# Patient Record
Sex: FEMALE | Race: WHITE | NOT HISPANIC OR LATINO | Employment: PART TIME | URBAN - METROPOLITAN AREA
[De-identification: names, ages, dates, MRNs, and addresses within clinical notes are randomized per-mention and may not be internally consistent; named-entity substitution may affect disease eponyms.]

---

## 2018-02-06 ENCOUNTER — OFFICE VISIT (OUTPATIENT)
Dept: MEDICAL GROUP | Facility: MEDICAL CENTER | Age: 73
End: 2018-02-06
Payer: MEDICARE

## 2018-02-06 VITALS
SYSTOLIC BLOOD PRESSURE: 124 MMHG | DIASTOLIC BLOOD PRESSURE: 70 MMHG | BODY MASS INDEX: 35.17 KG/M2 | RESPIRATION RATE: 16 BRPM | HEIGHT: 62 IN | WEIGHT: 191.14 LBS | HEART RATE: 84 BPM | TEMPERATURE: 98.1 F | OXYGEN SATURATION: 96 %

## 2018-02-06 DIAGNOSIS — E66.9 OBESITY (BMI 30-39.9): ICD-10-CM

## 2018-02-06 DIAGNOSIS — Z76.89 ENCOUNTER TO ESTABLISH CARE: ICD-10-CM

## 2018-02-06 DIAGNOSIS — E10.319 TYPE 1 DIABETES MELLITUS WITH RETINOPATHY, MACULAR EDEMA PRESENCE UNSPECIFIED, UNSPECIFIED LATERALITY, UNSPECIFIED RETINOPATHY SEVERITY (HCC): ICD-10-CM

## 2018-02-06 DIAGNOSIS — E03.9 ACQUIRED HYPOTHYROIDISM: ICD-10-CM

## 2018-02-06 PROBLEM — E10.39 TYPE 1 DIABETES MELLITUS WITH OPHTHALMIC COMPLICATION (HCC): Status: ACTIVE | Noted: 2018-02-06

## 2018-02-06 LAB
HBA1C MFR BLD: 8.9 % (ref ?–5.8)
INT CON NEG: NORMAL
INT CON POS: NORMAL

## 2018-02-06 PROCEDURE — 83036 HEMOGLOBIN GLYCOSYLATED A1C: CPT | Performed by: PHYSICIAN ASSISTANT

## 2018-02-06 PROCEDURE — 99204 OFFICE O/P NEW MOD 45 MIN: CPT | Performed by: PHYSICIAN ASSISTANT

## 2018-02-06 RX ORDER — LISINOPRIL 20 MG/1
20 TABLET ORAL DAILY
COMMUNITY
End: 2018-02-06

## 2018-02-06 RX ORDER — INSULIN GLARGINE 100 [IU]/ML
INJECTION, SOLUTION SUBCUTANEOUS NIGHTLY
COMMUNITY
End: 2018-12-31 | Stop reason: SDUPTHER

## 2018-02-06 RX ORDER — CHOLESTYRAMINE LIGHT 4 G/5.7G
POWDER, FOR SUSPENSION ORAL
COMMUNITY

## 2018-02-06 RX ORDER — LEVOTHYROXINE SODIUM 112 UG/1
112 TABLET ORAL
COMMUNITY
End: 2018-06-05 | Stop reason: SDUPTHER

## 2018-02-06 RX ORDER — INDAPAMIDE 2.5 MG/1
2.5 TABLET ORAL EVERY MORNING
COMMUNITY
End: 2018-02-06

## 2018-02-06 RX ORDER — LISINOPRIL 20 MG/1
20 TABLET ORAL DAILY
Qty: 30 TAB | Refills: 0
Start: 2018-02-06 | End: 2019-02-05 | Stop reason: SDUPTHER

## 2018-02-06 RX ORDER — LANCETS 30 GAUGE
EACH MISCELLANEOUS
COMMUNITY
End: 2018-05-11

## 2018-02-06 RX ORDER — ATORVASTATIN CALCIUM 20 MG/1
20 TABLET, FILM COATED ORAL NIGHTLY
COMMUNITY
End: 2018-05-04 | Stop reason: SDUPTHER

## 2018-02-06 RX ORDER — ERGOCALCIFEROL 1.25 MG/1
CAPSULE ORAL
COMMUNITY
End: 2018-12-31 | Stop reason: SDUPTHER

## 2018-02-06 ASSESSMENT — PATIENT HEALTH QUESTIONNAIRE - PHQ9
CLINICAL INTERPRETATION OF PHQ2 SCORE: 1
SUM OF ALL RESPONSES TO PHQ QUESTIONS 1-9: 7
5. POOR APPETITE OR OVEREATING: 0 - NOT AT ALL

## 2018-02-06 NOTE — ASSESSMENT & PLAN NOTE
This is a 72-year-old female who is here today to establish care. She also is here to discuss her type 1 diabetes. Recently moved from [place]. She believes that over the past month or 2 her diabetes has been coming out of control. She is currently on Humalog and Lantus. Wants to be referred to endocrinology. Does have complications of retinopathy. Is seen by a retinal specialist. Would like to see somebody close to her home on [facility].

## 2018-02-06 NOTE — PROGRESS NOTES
"Subjective:   Geraldine Scott is a 72 y.o. female here today for uncontrolled diabetes and to establish care.    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 She also is here to discuss her type 1 diabetes. Recently moved from [place]. She believes that over the past month or 2 her diabetes has been coming out of control. She is currently on Humalog and Lantus. Wants to be referred to endocrinology. Does have complications of retinopathy. Is seen by a retinal specialist. Would like to see somebody close to her home on [facility].    Acquired hypothyroidism  Years ago she was diagnosed with hypothyroidism. For a long time has been taking levothyroxine 112 µg daily.       Current medicines (including changes today)  Reviewed and updated accordingly.    She  has no past medical history on file.    Social History and Family History were reviewed and updated.    ROS   No chest pain, no shortness of breath, no abdominal pain and all other systems were reviewed and are negative.       Objective:     Blood pressure 124/70, pulse 84, temperature 36.7 °C (98.1 °F), resp. rate 16, height 1.585 m (5' 2.4\"), weight 86.7 kg (191 lb 2.2 oz), SpO2 96 %. Body mass index is 34.51 kg/m².   Physical Exam:  Constitutional: Alert, no distress.  Skin: Warm, dry, good turgor, no rashes in visible areas.  Eye: Equal, round and reactive, conjunctiva clear, lids normal.  ENMT: Lips without lesions, good dentition, oropharynx clear.  Neck: Trachea midline, no masses.   Lymph: No cervical or supraclavicular lymphadenopathy  Respiratory: Unlabored respiratory effort, lungs appear clear, no wheezes.  Cardiovascular: Normal S1, S2, no murmur, no edema.  Psych: Alert and oriented x3, normal affect and mood.    A1c at 8.9.    Assessment and Plan:   The following treatment plan was discussed    1. Type 1 diabetes mellitus with retinopathy, macular edema presence " unspecified, unspecified laterality, unspecified retinopathy severity (CMS-HCC)  Chronic condition. Uncontrolled diabetes currently. Advised to increase Lantus use 2 units to obtain a better fasting glucose such as 140. Refer to endocrinology. Contact me if she needs any medication renewals.  - REFERRAL TO ENDOCRINOLOGY  - POCT  A1C  - lisinopril (PRINIVIL) 20 MG Tab; Take 1 Tab by mouth every day.  Dispense: 30 Tab; Refill: 0    2. Obesity (BMI 30-39.9)  Chronic condition. Urged exercise routinely. Heat healthier.  - Patient identified as having weight management issue.  Appropriate orders and counseling given.    3. Acquired hypothyroidism  Chronic condition. Status unknown. Referred to endocrinology.    4. Encounter to establish care      Followup: Return if symptoms worsen or fail to improve.    Please note that this dictation was created using voice recognition software. I have made every reasonable attempt to correct obvious errors, but I expect that there are errors of grammar and possibly content that I did not discover before finalizing the note.

## 2018-02-06 NOTE — PROGRESS NOTES
"Subjective:   Geraldine Scott is a 72 y.o. female here today for ***    No problem-specific Assessment & Plan notes found for this encounter.     ***    Current medicines (including changes today)  Current Outpatient Prescriptions   Medication Sig Dispense Refill   • glucose blood strip 1 Each by Other route as needed.     • Lancets Misc by Does not apply route. Lancets order: Lancets for {AMB DIABETES SUPPLIES:6081880} meter. Sig: use *** and prn ssx high or low sugar. #100 RF x 3     • insulin glargine (LANTUS) 100 UNIT/ML Solution Inject  as instructed every evening.     • levothyroxine (SYNTHROID) 112 MCG Tab Take 112 mcg by mouth Every morning on an empty stomach.     • Insulin Pen Needle 32G X 5 MM Misc by Does not apply route.     • cholestyramine light (PREVALITE) 4 GM/DOSE powder Take  by mouth.     • vitamin D, Ergocalciferol, (DRISDOL) 21304 units Cap capsule Take  by mouth every 7 days.     • atorvastatin (LIPITOR) 20 MG Tab Take 20 mg by mouth every evening.     • Insulin Lispro 100 UNIT/ML Solution Cartridge Inject  as instructed.       No current facility-administered medications for this visit.      She  has no past medical history on file.    Social History and Family History were reviewed and updated.    ROS ***  No chest pain, no shortness of breath, no abdominal pain and all other systems were reviewed and are negative.       Objective:     Blood pressure 124/70, pulse 84, temperature 36.7 °C (98.1 °F), resp. rate 16, height 1.585 m (5' 2.4\"), weight 86.7 kg (191 lb 2.2 oz), SpO2 96 %. Body mass index is 34.51 kg/m². ***  Physical Exam:  Constitutional: Alert, no distress.  Skin: Warm, dry, good turgor, no rashes in visible areas.  Eye: Equal, round and reactive, conjunctiva clear, lids normal.  ENMT: Lips without lesions, good dentition, oropharynx clear.  Neck: Trachea midline, no masses.   Lymph: No cervical or supraclavicular lymphadenopathy  Respiratory: Unlabored respiratory effort, lungs " clear to auscultation, no wheezes, no ronchi.  Cardiovascular: Normal S1, S2, no murmur, no edema.  Abdomen: Soft, non-tender, no masses.  Psych: Alert and oriented x3, normal affect and mood.        Assessment and Plan:   The following treatment plan was discussed    1. Type 1 diabetes mellitus with retinopathy, macular edema presence unspecified, unspecified laterality, unspecified retinopathy severity (CMS-HCC)  ***  - REFERRAL TO ENDOCRINOLOGY  - POCT  A1C    2. Obesity (BMI 30-39.9)  ***  - Patient identified as having weight management issue.  Appropriate orders and counseling given.    3. Acquired hypothyroidism  ***      Followup: No Follow-up on file.    Please note that this dictation was created using voice recognition software. I have made every reasonable attempt to correct obvious errors, but I expect that there are errors of grammar and possibly content that I did not discover before finalizing the note.

## 2018-02-06 NOTE — ASSESSMENT & PLAN NOTE
Years ago she was diagnosed with hypothyroidism. For a long time has been taking levothyroxine 112 µg daily.

## 2018-03-15 ENCOUNTER — APPOINTMENT (OUTPATIENT)
Dept: OTHER | Facility: IMAGING CENTER | Age: 73
End: 2018-03-15

## 2018-04-30 ENCOUNTER — OFFICE VISIT (OUTPATIENT)
Dept: ENDOCRINOLOGY | Facility: MEDICAL CENTER | Age: 73
End: 2018-04-30
Payer: MEDICARE

## 2018-04-30 VITALS
WEIGHT: 193 LBS | HEART RATE: 87 BPM | OXYGEN SATURATION: 97 % | HEIGHT: 62 IN | DIASTOLIC BLOOD PRESSURE: 70 MMHG | SYSTOLIC BLOOD PRESSURE: 140 MMHG | BODY MASS INDEX: 35.51 KG/M2

## 2018-04-30 DIAGNOSIS — E78.2 MIXED HYPERLIPIDEMIA: ICD-10-CM

## 2018-04-30 DIAGNOSIS — I10 ESSENTIAL HYPERTENSION: ICD-10-CM

## 2018-04-30 DIAGNOSIS — E10.65 TYPE 1 DIABETES MELLITUS WITH HYPERGLYCEMIA (HCC): ICD-10-CM

## 2018-04-30 DIAGNOSIS — E03.9 ACQUIRED HYPOTHYROIDISM: ICD-10-CM

## 2018-04-30 PROCEDURE — 99204 OFFICE O/P NEW MOD 45 MIN: CPT | Performed by: INTERNAL MEDICINE

## 2018-04-30 NOTE — PROGRESS NOTES
New Patient Consult Note  Referred by: Clay Maguire P.A.-C.    Reason for consult: Diabetes management    HPI:  Geraldine Scott is a 72 y.o. old patient who comes in today to establish care for diabetes. She was diagnosed with type 1 diabetes in 1994. Initially she was diagnosed with type 2 diabetes and a few years later she went into diabetic ketoacidosis and then diagnosed with type 1 diabetes. She has been on insulin ever since. She is currently taking Lantus 15 units every morning and 16 units at bedtime. She takes Humalog one unit per 6 g of carbs plus correction factor of one additional unit for every 25 mg/dL blood glucose above 150. She reports compliance with medications. She checks blood sugars 4-5 times a day. She has continuous glucose monitoring system as well. Fasting blood sugar in the morning has mostly been above 140. Pre-meal blood sugar readings are well controlled. Bedtime blood sugar readings are higher than goal.    ROS:  Constitutional: No weight loss  Cardiac: No palpitations or racing heart  Resp: No shortness of breath  All other systems were reviewed and were negative.    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     Social History Main Topics   • Smoking status: Never Smoker   • Smokeless tobacco: Never Used   • Alcohol use No   • Drug use: No   • Sexual activity: Not Currently     Other Topics Concern   • Not on file     Social  "History Narrative   • No narrative on file       Family History:  History reviewed. No pertinent family history.    Physical Examination:  Vital signs: /70   Pulse 87   Ht 1.575 m (5' 2\")   Wt 87.5 kg (193 lb)   SpO2 97%   BMI 35.30 kg/m²   General: No apparent distress, cooperative  Eyes: No scleral icterus or discharge  ENMT: Normal on external inspection of nose, lips  Neck: No abnormal masses on inspection  Resp: Normal effort, clear to auscultation bilaterally   CVS: Regular rate and rhythm, S1 S2 normal, no murmur   Extremities: No edema  Neuro: Alert and oriented  Skin: No rash  Psych: Normal mood and affect  Foot exam: normal sensation to monofilament testing, normal pulses, no ulcers    Assessment and Plan:    1. Type 1 diabetes mellitus with hyperglycemia (HCC)  · Hemoglobin A1c in February was 8.9%  · Goal A1c less than 7.5%  · Increased Lantus to 16 units twice a day  · Changed Humalog to one unit per 6 g of carbs for breakfast and lunch, and one unit for 5 g of carbs for dinner, plus correction factor of one additional unit for every 25 mg/dL blood glucose above 150.  · Repeat labs:  - BASIC METABOLIC PANEL; Future  - MICROALBUMIN CREAT RATIO URINE; Future  - Blood Glucose Monitoring Suppl Supplies Misc; reBounces contour glucometer test strips for BG check 4-5 times a day  Dispense: 150 Each; Refill: 6    2. Acquired hypothyroidism  · Continue levothyroxine 112 µg daily  · Repeat labs  - TSH; Future  - FREE THYROXINE; Future    3. Essential hypertension  · Blood pressure is slightly higher than goal in clinic today, however she reports that home blood pressure readings are well controlled  · Continue lisinopril    4. Mixed hyperlipidemia  · Continue Lipitor  · Repeat labs  - LIPID PROFILE; Future    Return in about 3 months (around 7/30/2018).    Thank you for allowing me to participate in the care of this patient.    Destini Rae M.D.  04/30/18    CC:   Clay Maguire P.A.-C.    This note " was created using voice recognition software (Dragon). The accuracy of the dictation is limited by the abilities of the software. I have reviewed the note prior to signing, however some errors in grammar and context are still possible. If you have any questions related to this note please do not hesitate to contact our office.

## 2018-04-30 NOTE — PATIENT INSTRUCTIONS
INSULIN DOSING    Long acting insulin: Lantus  16 units at twice a day (12 hrs apart)   Short acting insulin: Humalog 1 unit for 6 gram carbs for b'fast and lunch, and 1 unit for 5 gram of carbs for dinner     Adjusting long acting insulin dose: Check fasting blood sugar every morning. If fasting blood sugar in the morning is greater than 140 for 3 days in a row then increase the dose of your long-acting insulin by 1 unit(s). In case you have any low blood sugar (less than 70) in the middle of the night or in the morning before breakfast, then lower the dose of your long-acting insulin by 2 units. Goal blood sugar in the morning is in the range of .    Adjusting short-acting insulin dose: Check your blood sugar just before eating, if pre- meal blood sugar reading is less than 140 then take the scheduled dose of short-acting insulin as mentioned above. However if your blood sugar before a meal is greater than 140 then add additional short-acting insulin as per scale below:    Blood sugar reading: Dose of additional short acting insulin:   151-175 1 unit   176-200 2 units   201-225 3 units   226-250 4 units   251-275 5 units   276-300 6 units   Over 300 7 units     Goal blood sugar 2 hours after meals: Under 180    Frequency of blood sugar checks: Fasting in the morning, and just before each meal.    Hypoglycemia symptom recognition:   Insulin can cause low blood sugar, symptoms include:   · Shakiness  · Weakness  · Sweating  · Hunger  · Confusion  · Nightmares or nighttime sweats if the low blood sugar happens while you are asleep    Low blood sugar symptoms typically start when your blood sugar is less than 70 mg/dl. If untreated, extreme low blood sugar can lead to:  · Unconsciousness   · Seizures    Rarely, some people with diabetes slip into hypoglycemia without these typical symptoms. This is called hypoglycemia unawareness. Most episodes of low blood sugar can be prevented by good planning. Skipping  meals, taking too much insulin or exercising can cause hypoglycemia.     Hypoglycemia treatment:   Treat low blood sugars as soon as possible. Always have a source of carbohydrate with you. Here are some handy foods to treat hypoglycemia quickly:  · Three to five glucose tablets (usually contain about 3 grams of glucose each)  · 4 ounces of fruit juice or 8 ounces of milk  · Cake frosting in a tube, about a tablespoon    Hypoglycemia can also be treated with an injected medication called glucagon. Glucagon comes in a small, red plastic box. The glucagon is a powder in a sterile vial that needs to be mixed with a liquid before injection. The glucagon kit can be kept in the refrigerator for up to 2 years. It is a good idea to teach someone who lives with you on how to mix and inject glucagon. The glucagon kit comes with very clear directions.     Glucagon causes some brief nausea. Glucagon works for just 10-15 minutes; so it is important to eat or drink some sugar after glucagon is given.     The Rule of 15:  The Rule of 15 is a simple tool that is easy to remember. When you think you are having a hypoglycemic reaction:   · Take your blood sugar (if you can't do this safely, just treat the symptoms!)  · If it is less than 70 mg/dl, eat 15 grams of carbohydrate  · Wait for 15 minutes  · If your blood sugar is still less than 70, take another 15 grams of carbohydrate and re-check your blood sugar in 15 minutes

## 2018-05-04 RX ORDER — ATORVASTATIN CALCIUM 20 MG/1
20 TABLET, FILM COATED ORAL
Qty: 30 TAB | Refills: 6 | Status: SHIPPED | OUTPATIENT
Start: 2018-05-04 | End: 2018-10-31 | Stop reason: SDUPTHER

## 2018-05-05 ENCOUNTER — HOSPITAL ENCOUNTER (OUTPATIENT)
Dept: LAB | Facility: MEDICAL CENTER | Age: 73
End: 2018-05-05
Attending: INTERNAL MEDICINE
Payer: MEDICARE

## 2018-05-05 DIAGNOSIS — E78.2 MIXED HYPERLIPIDEMIA: ICD-10-CM

## 2018-05-05 DIAGNOSIS — E10.65 TYPE 1 DIABETES MELLITUS WITH HYPERGLYCEMIA (HCC): ICD-10-CM

## 2018-05-05 DIAGNOSIS — E03.9 ACQUIRED HYPOTHYROIDISM: ICD-10-CM

## 2018-05-05 LAB
ANION GAP SERPL CALC-SCNC: 8 MMOL/L (ref 0–11.9)
BUN SERPL-MCNC: 19 MG/DL (ref 8–22)
CALCIUM SERPL-MCNC: 9.1 MG/DL (ref 8.5–10.5)
CHLORIDE SERPL-SCNC: 106 MMOL/L (ref 96–112)
CHOLEST SERPL-MCNC: 154 MG/DL (ref 100–199)
CO2 SERPL-SCNC: 26 MMOL/L (ref 20–33)
CREAT SERPL-MCNC: 0.63 MG/DL (ref 0.5–1.4)
CREAT UR-MCNC: 93.3 MG/DL
GLUCOSE SERPL-MCNC: 195 MG/DL (ref 65–99)
HDLC SERPL-MCNC: 62 MG/DL
LDLC SERPL CALC-MCNC: 71 MG/DL
MICROALBUMIN UR-MCNC: 3.9 MG/DL
MICROALBUMIN/CREAT UR: 42 MG/G (ref 0–30)
POTASSIUM SERPL-SCNC: 4.8 MMOL/L (ref 3.6–5.5)
SODIUM SERPL-SCNC: 140 MMOL/L (ref 135–145)
T4 FREE SERPL-MCNC: 1.21 NG/DL (ref 0.53–1.43)
TRIGL SERPL-MCNC: 103 MG/DL (ref 0–149)
TSH SERPL DL<=0.005 MIU/L-ACNC: 1.24 UIU/ML (ref 0.38–5.33)

## 2018-05-05 PROCEDURE — 80048 BASIC METABOLIC PNL TOTAL CA: CPT

## 2018-05-05 PROCEDURE — 82570 ASSAY OF URINE CREATININE: CPT

## 2018-05-05 PROCEDURE — 36415 COLL VENOUS BLD VENIPUNCTURE: CPT

## 2018-05-05 PROCEDURE — 82043 UR ALBUMIN QUANTITATIVE: CPT

## 2018-05-05 PROCEDURE — 80061 LIPID PANEL: CPT

## 2018-05-05 PROCEDURE — 84439 ASSAY OF FREE THYROXINE: CPT

## 2018-05-05 PROCEDURE — 84443 ASSAY THYROID STIM HORMONE: CPT

## 2018-05-11 ENCOUNTER — OFFICE VISIT (OUTPATIENT)
Dept: MEDICAL GROUP | Facility: MEDICAL CENTER | Age: 73
End: 2018-05-11
Payer: MEDICARE

## 2018-05-11 VITALS
OXYGEN SATURATION: 98 % | SYSTOLIC BLOOD PRESSURE: 108 MMHG | TEMPERATURE: 98 F | HEART RATE: 83 BPM | BODY MASS INDEX: 36.35 KG/M2 | WEIGHT: 197.53 LBS | HEIGHT: 62 IN | DIASTOLIC BLOOD PRESSURE: 60 MMHG

## 2018-05-11 DIAGNOSIS — Z78.9 PATIENT HAS ACTIVE PHYSICIAN ORDERS FOR LIFE-SUSTAINING TREATMENT (POLST) FORM: ICD-10-CM

## 2018-05-11 DIAGNOSIS — E10.319 TYPE 1 DIABETES MELLITUS WITH RETINOPATHY, MACULAR EDEMA PRESENCE UNSPECIFIED, UNSPECIFIED LATERALITY, UNSPECIFIED RETINOPATHY SEVERITY (HCC): ICD-10-CM

## 2018-05-11 DIAGNOSIS — E66.9 OBESITY (BMI 30-39.9): ICD-10-CM

## 2018-05-11 DIAGNOSIS — Z12.31 SCREENING MAMMOGRAM, ENCOUNTER FOR: ICD-10-CM

## 2018-05-11 DIAGNOSIS — E03.9 ACQUIRED HYPOTHYROIDISM: ICD-10-CM

## 2018-05-11 PROCEDURE — 99214 OFFICE O/P EST MOD 30 MIN: CPT | Performed by: PHYSICIAN ASSISTANT

## 2018-05-11 NOTE — ASSESSMENT & PLAN NOTE
Is not losing weight. Is gaining weight. Today she drank 4 cups of coffee. She knows what she needs to do but just doesn't do it.

## 2018-05-11 NOTE — PROGRESS NOTES
Subjective:   Geraldine Scott is a 72 y.o. female here today for type 1 diabetes and hypothyroidism.    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 She saw endocrinology. Insulin was changed to 16 units twice a day. This morning her glucose levels were high. Last night she had a poor diet. Recent fasting glucose level was in the 190s. Last A1c was 8.9. She is trying to make some conscientious dietary decisions. She doesn't exercise.    Acquired hypothyroidism  She also has chronic hypothyroidism. Is on Synthroid 112 µg. Last TSH value was within normal limits. She did see endocrinology. Everything is stable with her thyroid.    Obesity (BMI 30-39.9)  Is not losing weight. Is gaining weight. Today she drank 4 cups of coffee. She knows what she needs to do but just doesn't do it.    Patient has active physician orders for life-sustaining treatment (POLST) form  Lastly she wanted to discuss completing a POLST form. She has no interest in being resuscitated.        Current medicines (including changes today)  Current Outpatient Prescriptions   Medication Sig Dispense Refill   • atorvastatin (LIPITOR) 20 MG Tab Take 1 Tab by mouth every bedtime. 30 Tab 6   • Blood Glucose Monitoring Suppl Supplies Misc Aiden contour glucometer test strips for BG check 4-5 times a day 150 Each 6   • glucose blood strip 1 Each by Other route as needed.     • insulin glargine (LANTUS) 100 UNIT/ML Solution Inject  as instructed every evening.     • levothyroxine (SYNTHROID) 112 MCG Tab Take 112 mcg by mouth Every morning on an empty stomach.     • cholestyramine light (PREVALITE) 4 GM/DOSE powder Take  by mouth.     • vitamin D, Ergocalciferol, (DRISDOL) 92468 units Cap capsule Take  by mouth every 7 days.     • Insulin Lispro 100 UNIT/ML Solution Cartridge Inject  as instructed.     • lisinopril (PRINIVIL) 20 MG Tab Take 1 Tab by mouth every  "day. 30 Tab 0   • Lancets Misc by Does not apply route. Lancets order: Lancets for {AMB DIABETES SUPPLIES:1558081} meter. Sig: use *** and prn ssx high or low sugar. #100 RF x 3     • Insulin Pen Needle 32G X 5 MM Misc by Does not apply route.       No current facility-administered medications for this visit.      She  has no past medical history on file.    Social History and Family History were reviewed and updated.    ROS   No chest pain, no shortness of breath, no abdominal pain and all other systems were reviewed and are negative.       Objective:     Blood pressure 108/60, pulse 83, temperature 36.7 °C (98 °F), height 1.575 m (5' 2\"), weight 89.6 kg (197 lb 8.5 oz), SpO2 98 %. Body mass index is 36.13 kg/m².   Physical Exam:  Constitutional: Alert, no distress.  Skin: Warm, dry, good turgor, no rashes in visible areas.  Eye: Equal, round and reactive, conjunctiva clear, lids normal.  ENMT: Lips without lesions, good dentition, oropharynx clear.  Neck: Trachea midline, no masses.   Lymph: No cervical or supraclavicular lymphadenopathy  Respiratory: Unlabored respiratory effort, lungs clear to auscultation, no wheezes, no ronchi.  Cardiovascular: Normal S1, S2, no murmur, no edema.  Psych: Alert and oriented x3, normal affect and mood.        Assessment and Plan:   The following treatment plan was discussed    1. Type 1 diabetes mellitus with retinopathy, macular edema presence unspecified, unspecified laterality, unspecified retinopathy severity (HCC)  Chronic condition. Uncontrolled. Continue Lantus 16 units twice a day. Advised to eat healthier. Refer to dietitian. Counter carbs accurately and then dose insulin appropriately.  - REFERRAL TO [facility] HEALTH IMPROVEMENT PROGRAMS (HIP) Services Requested: Registered Dietitian Medicare Obesity Counseling, Registered Dietitian for Medical Nutrition Therapy; Reason for Visit: Overweight/Obesity, Medical Condition Requiring Nutrit...    2. Acquired " hypothyroidism  Tonic condition. Stable. Continue levothyroxine 112 µg.    3. Obesity (BMI 30-39.9)  Chronic condition. Worsening. Refer to dietitian.    4. Screening mammogram, encounter for  Ordered mammogram screening.  - MA-SCREEN MAMMO W/CAD-BILAT; Future    5. Patient has active physician orders for life-sustaining treatment (POLST) form  Patient brought this upon the meeting at the very end. Advised her to follow-up with Dr. Lincoln to complete the form. He was disappointed but agreed.      Followup: Return if symptoms worsen or fail to improve.    Please note that this dictation was created using voice recognition software. I have made every reasonable attempt to correct obvious errors, but I expect that there are errors of grammar and possibly content that I did not discover before finalizing the note.

## 2018-05-11 NOTE — ASSESSMENT & PLAN NOTE
This is a 72-year-old female follows up to discuss her type 1 diabetes. She saw endocrinology. Insulin was changed to 16 units twice a day. This morning her glucose levels were high. Last night she had a poor diet. Recent fasting glucose level was in the 190s. Last A1c was 8.9. She is trying to make some conscientious dietary decisions. She doesn't exercise.

## 2018-05-11 NOTE — PROGRESS NOTES
"Subjective:   Geraldine Scott is a 72 y.o. female here today for type 1 diabetes and hypothyroidism.    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 She saw endocrinology. Insulin was changed to 16 units twice a day. This morning her glucose levels were high. Last night she had a poor diet. Recent fasting glucose level was in the 190s. Last A1c was 8.9. She is trying to make some conscientious dietary decisions. She doesn't exercise.    Acquired hypothyroidism  She also has chronic hypothyroidism. Is on Synthroid 112 µg. Last TSH value was within normal limits. She did see endocrinology. Everything is stable with her thyroid.    Obesity (BMI 30-39.9)  Is not losing weight. Is gaining weight. Today she drank 4 cups of coffee. She knows what she needs to do but just doesn't do it.    Patient has active physician orders for life-sustaining treatment (POLST) form  Lastly she wanted to discuss completing a POLST form. She has no interest in being resuscitated.        Current medicines (including changes today)  Medications include Lantus 16 units twice a day, Synthroid 112 µg daily, lisinopril 20 mg daily and Lipitor 20 mg daily. Also vitamin D 50,000 units weekly.    She  has no past medical history on file.      Social History and Family History were reviewed and updated.    ROS   No chest pain, no shortness of breath, no abdominal pain and all other systems were reviewed and are negative.       Objective:     Blood pressure 108/60, pulse 83, temperature 36.7 °C (98 °F), height 1.575 m (5' 2\"), weight 89.6 kg (197 lb 8.5 oz), SpO2 98 %. Body mass index is 36.13 kg/m².   Physical Exam:  Constitutional: Alert, no distress.  Skin: Warm, dry, good turgor, no rashes in visible areas.  Eye: Equal, round and reactive, conjunctiva clear, lids normal.  ENMT: Lips without lesions, good dentition, oropharynx clear.  Neck: Trachea " midline, no masses.   Lymph: No cervical or supraclavicular lymphadenopathy  Respiratory: Unlabored respiratory effort, lungs clear to auscultation, no wheezes, no ronchi.  Cardiovascular: Normal S1, S2, no murmur, no edema.  Psych: Alert and oriented x3, normal affect and mood.        Assessment and Plan:   The following treatment plan was discussed    1. Type 1 diabetes mellitus with retinopathy, macular edema presence unspecified, unspecified laterality, unspecified retinopathy severity (HCC)  Chronic condition. Uncontrolled. Continue Lantus 16 units twice a day. Advised to be more careful about carbohydrate intake. Have referred her to a dietitian. Follow with endocrinology as needed.  - REFERRAL TO [facility] (HIP) Services Requested: Registered Dietitian Medicare Obesity Counseling, Registered Dietitian for Medical Nutrition Therapy; Reason for Visit: Overweight/Obesity, Medical Condition Requiring Nutrit...    2. Acquired hypothyroidism  Chronic condition. Stable. Continue levothyroxine as directed.    3. Obesity (BMI 30-39.9)  Chronic condition. Gaining weight steadily. Referred to dietitian.    4. Screening mammogram, encounter for  Ordered mammogram screening.  - MA-SCREEN MAMMO W/CAD-BILAT; Future    5. Patient has active physician orders for life-sustaining treatment (POLST) form  She brought this to my attention at the very end. I discussed with her that it takes time to fill out the form. Advised her to follow up with a physician and she was scheduled with Dr. Lincoln. Discussed with her that the form must be left at the office. She was disappointed but agreed.      Followup: Return if symptoms worsen or fail to improve.    Please note that this dictation was created using voice recognition software. I have made every reasonable attempt to correct obvious errors, but I expect that there are errors of grammar and possibly content that I did not discover before finalizing the  note.

## 2018-05-11 NOTE — LETTER
[employer]  Clay Maguire P.A.-C.  [address]  [place] [address]  Fax: [phone]   Authorization for Release/Disclosure of   Protected Health Information   Name: GERALDINE PARDO : 1945 SSN: [id number]   Address: [address] [place] [address] [id number]  [place] [address] Phone:    [phone] (home)    I authorize the entity listed below to release/disclose the PHI below to:   [employer]/Clay Maguire P.A.-C. and Clay Maguire P.A.-C.   Provider or Entity Name:  CHELE CLAIRE   Address   City, State, [facility], [place] Phone:      Fax:     Reason for request: continuity of care   Information to be released:    [ X] LAST COLONOSCOPY,  including any PATH REPORT and follow-up  [  ] LAST FIT/COLOGUARD RESULT [  ] LAST DEXA  [  ] LAST MAMMOGRAM  [  ] LAST PAP  [  ] LAST LABS [  ] RETINA EXAM REPORT  [  ] IMMUNIZATION RECORDS  [  ] Release all info      [  ] Check here and initial the line next to each item to release ALL health information INCLUDING  _____ Care and treatment for drug and / or alcohol abuse  _____ HIV testing, infection status, or AIDS  _____ Genetic Testing    DATES OF SERVICE OR TIME PERIOD TO BE DISCLOSED: _____________  I understand and acknowledge that:  * This Authorization may be revoked at any time by you in writing, except if your health information has already been used or disclosed.  * Your health information that will be used or disclosed as a result of you signing this authorization could be re-disclosed by the recipient. If this occurs, your re-disclosed health information may no longer be protected by State or Federal laws.  * You may refuse to sign this Authorization. Your refusal will not affect your ability to obtain treatment.  * This Authorization becomes effective upon signing and will  on (date) __________.      If no date is indicated, this Authorization will  one (1) year from the signature date.    Name: Geraldine Pardo    Signature:   Date:          5/11/2018       PLEASE FAX REQUESTED RECORDS BACK TO: [phone]

## 2018-05-11 NOTE — ASSESSMENT & PLAN NOTE
She also has chronic hypothyroidism. Is on Synthroid 112 µg. Last TSH value was within normal limits. She did see endocrinology. Everything is stable with her thyroid.

## 2018-05-23 RX ORDER — INSULIN LISPRO 100 [IU]/ML
10 INJECTION, SOLUTION INTRAVENOUS; SUBCUTANEOUS
COMMUNITY
Start: 2018-03-01 | End: 2019-03-18 | Stop reason: SDUPTHER

## 2018-05-23 RX ORDER — PEN NEEDLE, DIABETIC 32GX 5/32"
NEEDLE, DISPOSABLE MISCELLANEOUS
COMMUNITY
Start: 2018-03-05 | End: 2019-02-05 | Stop reason: SDUPTHER

## 2018-05-24 ENCOUNTER — OFFICE VISIT (OUTPATIENT)
Dept: MEDICAL GROUP | Facility: MEDICAL CENTER | Age: 73
End: 2018-05-24
Payer: MEDICARE

## 2018-05-24 VITALS
DIASTOLIC BLOOD PRESSURE: 62 MMHG | HEART RATE: 84 BPM | SYSTOLIC BLOOD PRESSURE: 110 MMHG | BODY MASS INDEX: 36.07 KG/M2 | WEIGHT: 196 LBS | TEMPERATURE: 98.7 F | HEIGHT: 62 IN | OXYGEN SATURATION: 97 %

## 2018-05-24 DIAGNOSIS — E03.9 ACQUIRED HYPOTHYROIDISM: ICD-10-CM

## 2018-05-24 DIAGNOSIS — E66.9 OBESITY (BMI 30-39.9): ICD-10-CM

## 2018-05-24 DIAGNOSIS — H61.23 BILATERAL IMPACTED CERUMEN: ICD-10-CM

## 2018-05-24 DIAGNOSIS — Z78.0 ASYMPTOMATIC POSTMENOPAUSAL STATUS: ICD-10-CM

## 2018-05-24 DIAGNOSIS — E10.311 TYPE 1 DIABETES MELLITUS WITH RETINOPATHY AND MACULAR EDEMA, UNSPECIFIED LATERALITY, UNSPECIFIED RETINOPATHY SEVERITY (HCC): ICD-10-CM

## 2018-05-24 DIAGNOSIS — R80.9 MICROALBUMINURIA: ICD-10-CM

## 2018-05-24 DIAGNOSIS — E78.5 DYSLIPIDEMIA: ICD-10-CM

## 2018-05-24 DIAGNOSIS — Z02.89 HEALTH EXAMINATION OF DEFINED SUBPOPULATION: ICD-10-CM

## 2018-05-24 PROCEDURE — 99214 OFFICE O/P EST MOD 30 MIN: CPT | Performed by: INTERNAL MEDICINE

## 2018-05-24 NOTE — PROGRESS NOTES
CHIEF COMPLIANT:   Chief Complaint   Patient presents with   • Other     POLST Form   • Medication Refill     Cholestyramine     Geraldine Scott is a 72 y.o. female here for DM follow up    DIABETES MELLITUS TYPE 1, with microalbuminuria  Onset/D49 y/o  Diabetes education:  Y    Medications:   · Insulin:   · Lantus 16 units BID  · Humalog RATIO 1 : 6 g of carbs for breakfast and lunch and 1 : For supper  5 ; plus  CF  1 additional U for every 25 mg/dL blood glucose > 150  · ACE/ARB: Y  · Statin:  Y  · ASA:  Y  Compliant with medications: yes  Checking feet daily/wear soft socks/shoes: advised    Diabetes ABCDE TARGETS  · A1c, last:   8.9  · Fingersticks:  4-5 times a day  · Mean BS:  In 140'  · Hypoglycemia:    · No symptoms of hypoglycemia: tremors, hunger, sometimes dizzy  · Blood Pressure, goal < 140/90: yes  · Cholesterol-Lipid Panel: Controlled  · Dysalbuminuria: Slightly positive    Body mass index is 35.85 kg/m².:     FH of DM:  [facility]    HYPERLIPIDEMIA  Statin: Atorvastatin 20 mg daily, taking as prescribed.  No muscle weakness, cramps, nausea,abdominal discomfort.   FH: N    HYPOTHYROIDISM  Levothyroxine, 88 mcg, taking daily early in am, before any po intake.  No temperature intolerance. No change in weight,  hair/skin quality, BMs.   No tremors, weakness.  No peripheral swelling.  No mood changes.  FH: Unknown    Reviewed PMH, PSH, FH, SH, ALL, IMM, MEDS.     Current medicines (including changes today)  Current Outpatient Prescriptions   Medication Sig Dispense Refill   • HUMALOG KWIKPEN 100 UNIT/ML Solution Pen-injector injection      • BD PEN NEEDLE DELMI U/F 32G X 4 MM Misc      • atorvastatin (LIPITOR) 20 MG Tab Take 1 Tab by mouth every bedtime. 30 Tab 6   • Blood Glucose Monitoring Suppl Supplies Misc Aiden contour glucometer test strips for BG check 4-5 times a day 150 Each 6   • glucose blood strip 1 Each by Other route as needed.     • insulin glargine (LANTUS) 100 UNIT/ML Solution Inject  as  instructed every evening.     • levothyroxine (SYNTHROID) 112 MCG Tab Take 112 mcg by mouth Every morning on an empty stomach.     • cholestyramine light (PREVALITE) 4 GM/DOSE powder Take  by mouth.     • vitamin D, Ergocalciferol, (DRISDOL) 71482 units Cap capsule Take  by mouth every 7 days.     • Insulin Lispro 100 UNIT/ML Solution Cartridge Inject  as instructed.     • lisinopril (PRINIVIL) 20 MG Tab Take 1 Tab by mouth every day. 30 Tab 0     No current facility-administered medications for this visit.      Allergies: Morphine and Sulfa drugs  She  has a past medical history of Diabetes mellitus type 1 (HCC); Dyslipidemia; and Obesity.  She  has a past surgical history that includes abdominal hysterectomy total and bladder suspension.  Social History   Substance Use Topics   • Smoking status: Never Smoker   • Smokeless tobacco: Never Used   • Alcohol use No     Social History     Social History Narrative   • No narrative on file     Family History   Problem Relation Age of Onset   • Arthritis Father    • Heart Disease Father    • No Known Problems Sister    • Diabetes Paternal Grandmother    • Hyperlipidemia Neg Hx      Family Status   Relation Status   • Mother  at age 85   • Father Alive   • Sister Alive   • Paternal Grandmother    • Neg Hx      ROS   Constitutional: Negative for fever, chills and weight loss.   HEENT: Negative for blurred vision, sore throat, swollen glands. No hearing loss or vertigo.  Respiratory: Negative for cough, wheezing, shortness of breath.   CVS: Negative for chest pain, palpitations, irregular heart beats, exertional dyspnea.   GI: Negative for heartburn, abdominal pain, nausea, vomiting, change in BMs.   : Negative for dysuria, polyuria.   MS: Negative for myalgias, joint pain.   Neuro: no headaches, numbness, weakness, seizures.   Skin: no skin lesions, rash.  Endocrine: per HPI.     PHYSICAL EXAM   Blood pressure 110/62, pulse 84, temperature 37.1 °C (98.7 °F), height  "1.575 m (5' 2\"), weight 88.9 kg (196 lb), SpO2 97 %. Body mass index is 35.85 kg/m².  Alert, oriented in no acute distress.  Eye contact is good, speech goal directed, affect bright.  HEENT: EOMI, PERRL, conjunctiva non-injected, sclera non-icteric. Bilateral cerumen impaction.  Nares patent with no significant congestion or drainage.  Normal pinnae, external auditory canals, TM pearly gray with normal light reflex bilaterally.  Oral mucous membranes pink and moist with no lesions.  Neck supple with no cervical lymphadenopathy, JVD, palpable thyroid nodules or carotid bruits.  Lungs: clear to auscultation bilaterally with good excursion.  CV: regular rate and rhythm, without murmur, rubs, thrills, or gallops.  Abdomen: soft, non-distended, non-tender with normal bowel sounds. No CVAT, No masses, or organomegaly.  Lower extremities: color normal, vascularity normal, no edema, temperature normal  Musculoskeletal: Normal gait. No obvious muscle weakness or wasting.  Psych: Normal mood and affect. Alert and oriented x3. Judgment and insight is normal.  Neuro:speech normal, mental status intact, cranial nerves 2-12 intact, gait, including heel, toe, and tandem walking normal, muscle tone normal, muscle strength normal, sensation to light touch and pinprick normal, reflexes normal and symmetric  DM foot exam: intact to pin prick, bilaterally.  Dorsalis pedis pulse is +4/4 bilaterally. No redness, ulcers, calluses.     LABS     Results reviewed and discussed with the patient, questions answered.    Last labs:  Lab Results   Component Value Date/Time    CHOLSTRLTOT 154 05/05/2018 07:30 AM    LDL 71 05/05/2018 07:30 AM    HDL 62 05/05/2018 07:30 AM    TRIGLYCERIDE 103 05/05/2018 07:30 AM       Lab Results   Component Value Date/Time    SODIUM 140 05/05/2018 07:30 AM    POTASSIUM 4.8 05/05/2018 07:30 AM    CHLORIDE 106 05/05/2018 07:30 AM    CO2 26 05/05/2018 07:30 AM    GLUCOSE 195 (H) 05/05/2018 07:30 AM    BUN 19 " 05/05/2018 07:30 AM    CREATININE 0.63 05/05/2018 07:30 AM     No results found for: ALKPHOSPHAT, ASTSGOT, ALTSGPT, TBILIRUBIN   Lab Results   Component Value Date/Time    HBA1C 8.9 02/06/2018 02:04 PM     No results found for: TSH  Lab Results   Component Value Date/Time    FREET4 1.21 05/05/2018 07:30 AM     ASSESMENT AND PLAN     1. Health examination of defined subpopulation  POLST form filled out, signed.    2. 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 Microalbuminuria  Recently changed treatment by endocrinology, follow-up labs.    4. Dyslipidemia  Controlled, continue current treatment    5. Acquired hypothyroidism  Controlled, continue current dose of levothyroxine    6. Obesity (BMI 30-39.9)  Appropriate counseling given    7. Asymptomatic postmenopausal status  - DS-BONE DENSITY STUDY (DEXA); Future    8. Bilateral impacted cerumen  Found on exam.  Apply ear wax drops, and come back for ear lavage in 2-3 weeks for ear lavage    Smoking Counseling: Nonsmoker    Followup: prn

## 2018-05-30 ENCOUNTER — HOSPITAL ENCOUNTER (OUTPATIENT)
Dept: RADIOLOGY | Facility: MEDICAL CENTER | Age: 73
End: 2018-05-30
Attending: PHYSICIAN ASSISTANT
Payer: MEDICARE

## 2018-05-30 ENCOUNTER — HOSPITAL ENCOUNTER (OUTPATIENT)
Dept: RADIOLOGY | Facility: MEDICAL CENTER | Age: 73
End: 2018-05-30
Attending: INTERNAL MEDICINE
Payer: MEDICARE

## 2018-05-30 DIAGNOSIS — Z78.0 ASYMPTOMATIC POSTMENOPAUSAL STATUS: ICD-10-CM

## 2018-05-30 DIAGNOSIS — Z12.31 SCREENING MAMMOGRAM, ENCOUNTER FOR: ICD-10-CM

## 2018-05-30 PROCEDURE — 77080 DXA BONE DENSITY AXIAL: CPT

## 2018-05-30 PROCEDURE — 77067 SCR MAMMO BI INCL CAD: CPT

## 2018-06-06 RX ORDER — LEVOTHYROXINE SODIUM 112 UG/1
112 TABLET ORAL
Qty: 30 TAB | Refills: 3 | Status: SHIPPED | OUTPATIENT
Start: 2018-06-06 | End: 2018-08-17 | Stop reason: SDUPTHER

## 2018-07-28 ENCOUNTER — HOSPITAL ENCOUNTER (OUTPATIENT)
Dept: LAB | Facility: MEDICAL CENTER | Age: 73
End: 2018-07-28
Attending: INTERNAL MEDICINE
Payer: MEDICARE

## 2018-07-28 DIAGNOSIS — E10.311 TYPE 1 DIABETES MELLITUS WITH RETINOPATHY AND MACULAR EDEMA, UNSPECIFIED LATERALITY, UNSPECIFIED RETINOPATHY SEVERITY (HCC): ICD-10-CM

## 2018-07-28 LAB
ALBUMIN SERPL BCP-MCNC: 4.4 G/DL (ref 3.2–4.9)
ALBUMIN/GLOB SERPL: 1.6 G/DL
ALP SERPL-CCNC: 64 U/L (ref 30–99)
ALT SERPL-CCNC: 16 U/L (ref 2–50)
ANION GAP SERPL CALC-SCNC: 8 MMOL/L (ref 0–11.9)
AST SERPL-CCNC: 17 U/L (ref 12–45)
BILIRUB SERPL-MCNC: 0.4 MG/DL (ref 0.1–1.5)
BUN SERPL-MCNC: 16 MG/DL (ref 8–22)
CALCIUM SERPL-MCNC: 9.6 MG/DL (ref 8.5–10.5)
CHLORIDE SERPL-SCNC: 102 MMOL/L (ref 96–112)
CO2 SERPL-SCNC: 25 MMOL/L (ref 20–33)
CREAT SERPL-MCNC: 0.86 MG/DL (ref 0.5–1.4)
CREAT UR-MCNC: 63.5 MG/DL
GLOBULIN SER CALC-MCNC: 2.8 G/DL (ref 1.9–3.5)
GLUCOSE SERPL-MCNC: 241 MG/DL (ref 65–99)
MICROALBUMIN UR-MCNC: 2.4 MG/DL
MICROALBUMIN/CREAT UR: 38 MG/G (ref 0–30)
POTASSIUM SERPL-SCNC: 5 MMOL/L (ref 3.6–5.5)
PROT SERPL-MCNC: 7.2 G/DL (ref 6–8.2)
SODIUM SERPL-SCNC: 135 MMOL/L (ref 135–145)

## 2018-07-28 PROCEDURE — 82570 ASSAY OF URINE CREATININE: CPT

## 2018-07-28 PROCEDURE — 82043 UR ALBUMIN QUANTITATIVE: CPT

## 2018-07-28 PROCEDURE — 36415 COLL VENOUS BLD VENIPUNCTURE: CPT | Mod: GA

## 2018-07-28 PROCEDURE — 80053 COMPREHEN METABOLIC PANEL: CPT

## 2018-07-28 PROCEDURE — 83036 HEMOGLOBIN GLYCOSYLATED A1C: CPT | Mod: GA

## 2018-07-31 LAB
EST. AVERAGE GLUCOSE BLD GHB EST-MCNC: 192 MG/DL
HBA1C MFR BLD: 8.3 % (ref 0–5.6)

## 2018-08-01 ENCOUNTER — OFFICE VISIT (OUTPATIENT)
Dept: ENDOCRINOLOGY | Facility: MEDICAL CENTER | Age: 73
End: 2018-08-01
Payer: MEDICARE

## 2018-08-01 VITALS
OXYGEN SATURATION: 96 % | HEART RATE: 85 BPM | HEIGHT: 62 IN | WEIGHT: 203 LBS | BODY MASS INDEX: 37.36 KG/M2 | SYSTOLIC BLOOD PRESSURE: 120 MMHG | DIASTOLIC BLOOD PRESSURE: 78 MMHG

## 2018-08-01 DIAGNOSIS — E78.2 MIXED HYPERLIPIDEMIA: ICD-10-CM

## 2018-08-01 DIAGNOSIS — E10.65 TYPE 1 DIABETES MELLITUS WITH HYPERGLYCEMIA (HCC): ICD-10-CM

## 2018-08-01 DIAGNOSIS — E03.9 ACQUIRED HYPOTHYROIDISM: ICD-10-CM

## 2018-08-01 DIAGNOSIS — I10 ESSENTIAL HYPERTENSION: ICD-10-CM

## 2018-08-01 PROCEDURE — 99214 OFFICE O/P EST MOD 30 MIN: CPT | Mod: 25 | Performed by: INTERNAL MEDICINE

## 2018-08-01 PROCEDURE — 95251 CONT GLUC MNTR ANALYSIS I&R: CPT | Performed by: INTERNAL MEDICINE

## 2018-08-01 NOTE — PROGRESS NOTES
"Endocrinology Clinic Progress Note    CC: Type 1 diabetes    HPI:  1. Type 1 diabetes mellitus with hyperglycemia (HCC)  She is currently on Lantus 16 units twice a day and Humalog 1 unit for 6 g of carbs.  She checks blood sugars 4-6 times a day and she has continuous glucose monitoring system as well.  CGM tracings reviewed.  Fasting blood sugar is well controlled.  Blood sugar spike after lunch is well controlled.  Blood sugar spike after breakfast and dinner is much higher than goal.  No recent hypoglycemia.  She denies numbness or tingling in feet.    2. Acquired hypothyroidism  Recent TSH is normal.  She is currently on levothyroxine 112 mcg daily.  She reports compliance with medications.    3. Essential hypertension  Blood pressure is well controlled.  She is on ACE inhibitor.    4. Mixed hyperlipidemia  She is currently on Lipitor, tolerating well.    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)   • Obesity (BMI 30-39.9)   • Acquired hypothyroidism   • Patient has active physician orders for life-sustaining treatment (POLST) form   • Dyslipidemia   • Bilateral impacted cerumen     EXAM:  Vital signs: /78   Pulse 85   Ht 1.575 m (5' 2\")   Wt 92.1 kg (203 lb)   SpO2 96%   BMI 37.13 kg/m²   General: No apparent distress, cooperative  Eyes: No scleral icterus, no discharge  Neck: Normal on external inspection  Resp: Normal effort  Extremities: No lower extremity edema  Skin: No rash on visible skin  Psych: Alert and oriented, normal mood and affect    Assessment and Plan:    1. Type 1 diabetes mellitus with hyperglycemia (HCC)  · Hemoglobin A1c last week was 8.3%  · Goal A1c less than 7.5%  · Continue Lantus 16 units twice a day  · Increased Humalog to 1 unit for 5 g of carbs for breakfast and dinner and continue 1 unit for 6 g " of carbs for lunch    2. Acquired hypothyroidism  · Continue levothyroxine 112 mcg    3. Essential hypertension  · Blood pressure is well controlled    4. Mixed hyperlipidemia  · Continue Lipitor    Return in about 3 months (around 11/1/2018).    Thank you for allowing me to participate in the care of this patient.    Destini Rae M.D.    CC:   Clay Maguire P.A.-C.    This note was created using voice recognition software (Dragon). The accuracy of the dictation is limited by the abilities of the software. I have reviewed the note prior to signing, however some errors in grammar and context are still possible. If you have any questions related to this note please do not hesitate to contact our office.

## 2018-08-17 NOTE — TELEPHONE ENCOUNTER
Was the patient seen in the last year in this department? Yes    Does patient have an active prescription for medications requested? No     Received Request Via: Pharmacy       levothyroxine (SYNTHROID) 112 MCG Tab

## 2018-08-20 RX ORDER — LEVOTHYROXINE SODIUM 112 UG/1
TABLET ORAL
Qty: 90 TAB | Refills: 0 | Status: SHIPPED | OUTPATIENT
Start: 2018-08-20 | End: 2018-10-01

## 2018-08-21 ENCOUNTER — OFFICE VISIT (OUTPATIENT)
Dept: MEDICAL GROUP | Facility: MEDICAL CENTER | Age: 73
End: 2018-08-21
Payer: MEDICARE

## 2018-08-21 VITALS
SYSTOLIC BLOOD PRESSURE: 94 MMHG | TEMPERATURE: 98.3 F | OXYGEN SATURATION: 95 % | HEIGHT: 62 IN | BODY MASS INDEX: 37.73 KG/M2 | WEIGHT: 205 LBS | HEART RATE: 80 BPM | DIASTOLIC BLOOD PRESSURE: 52 MMHG

## 2018-08-21 DIAGNOSIS — H61.22 IMPACTED CERUMEN OF LEFT EAR: ICD-10-CM

## 2018-08-21 DIAGNOSIS — E10.311 TYPE 1 DIABETES MELLITUS WITH RETINOPATHY AND MACULAR EDEMA, UNSPECIFIED LATERALITY, UNSPECIFIED RETINOPATHY SEVERITY (HCC): ICD-10-CM

## 2018-08-21 DIAGNOSIS — F33.0 MILD EPISODE OF RECURRENT MAJOR DEPRESSIVE DISORDER (HCC): ICD-10-CM

## 2018-08-21 PROCEDURE — 99214 OFFICE O/P EST MOD 30 MIN: CPT | Performed by: PHYSICIAN ASSISTANT

## 2018-08-21 NOTE — ASSESSMENT & PLAN NOTE
I evaluation on the 24th and was told she had wax in both of her ears. Denies any pain. No difficulty hearing. No symptoms whatsoever. Would like them to be evaluated today.

## 2018-08-21 NOTE — PROGRESS NOTES
Subjective:   Geraldine Scott is a 72 y.o. female here today for chronic depression, impaction of her ears with cerumen and diabetes type 1 uncontrolled.    Mild episode of recurrent major depressive disorder (HCC)  This is a 72-year-old female who is here today to discuss depression. She had it for many years. Denies any anxiety. She states she is [employer]. Doesn't go to [employer] on a regular basis. States that she is angry with God and some of the things that he has taken away from her and her life. She denies any homicidal or suicidal ideations because of her [place]. The past she was on Paxil as well as other medications. Tried Wellbutrin but the generic product gave her side effects. She would like to be referred to psychiatry.    Impacted cerumen of left ear  I evaluation on the 24th and was told she had wax in both of her ears. Denies any pain. No difficulty hearing. No symptoms whatsoever. Would like them to be evaluated today.    Type 1 diabetes mellitus with ophthalmic complication (CMS-[facility]) ([facility])  Has chronic diabetes. Follows with endocrinology now. A1c improved 6/10-8.3. She states that her numbers to fluctuate. Currently using Humalog and Lantus.       Current medicines (including changes today)  Current Outpatient Prescriptions   Medication Sig Dispense Refill   • levothyroxine (SYNTHROID) 112 MCG Tab TAKE ONE TABLET BY MOUTH IN THE MORNING on an empty stomach 90 Tab 0   • HUMALOG KWIKPEN 100 UNIT/ML Solution Pen-injector injection      • BD PEN NEEDLE DELMI U/F 32G X 4 MM Misc      • atorvastatin (LIPITOR) 20 MG Tab Take 1 Tab by mouth every bedtime. 30 Tab 6   • Blood Glucose Monitoring Suppl Supplies Misc Aiden contour glucometer test strips for BG check 4-5 times a day 150 Each 6   • glucose blood strip 1 Each by Other route as needed.     • insulin glargine (LANTUS) 100 UNIT/ML Solution Inject  as instructed every evening.     • cholestyramine light (PREVALITE) 4 GM/DOSE powder Take  by mouth.     •  "vitamin D, Ergocalciferol, (DRISDOL) 43061 units Cap capsule Take  by mouth every 7 days.     • lisinopril (PRINIVIL) 20 MG Tab Take 1 Tab by mouth every day. 30 Tab 0     No current facility-administered medications for this visit.      She  has a past medical history of Diabetes mellitus type 1 (HCC); Dyslipidemia; and Obesity.    Social History and Family History were reviewed and updated.    ROS   No chest pain, no shortness of breath, no abdominal pain and all other systems were reviewed and are negative.       Objective:     Blood pressure (!) 94/52, pulse 80, temperature 36.8 °C (98.3 °F), height 1.575 m (5' 2\"), weight 93 kg (205 lb), SpO2 95 %. Body mass index is 37.49 kg/m².   Physical Exam:  Constitutional: Alert, no distress.  Skin: Warm, dry, good turgor, no rashes in visible areas.  Eye: Equal, round and reactive, conjunctiva clear, lids normal.  ENMT: Lips without lesions, good dentition, oropharynx clear. Left TM with cerumen impaction. Right side is clear. Slight cerumen noted.  Neck: Trachea midline, no masses.   Lymph: No cervical or supraclavicular lymphadenopathy  Respiratory: Unlabored respiratory effort, lungs appear clear, no wheezes.  Cardiovascular: Normal S1, S2, no murmur, no edema.  Psych: Alert and oriented x3, normal affect and mood.        Assessment and Plan:   The following treatment plan was discussed    1. Mild episode of recurrent major depressive disorder (HCC)  Newly noted but a chronic condition. Referred to psychiatry. Advised as well as she would like taken referred to psychology in the future.   REFERRAL TO PSYCHIATRY    2. Impacted cerumen of left ear  Discuss lavage and using over-the-counter Debrox but she is asymptomatic so not interested in doing anything today. She may contact me or return with any symptoms.    3. 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 Slight " improvement but still uncontrolled. Follow with endocrinology. Provided information to call for dietitian. Referred to diabetic education. Continue medications as directed.  - REFERRAL TO DIABETIC EDUCATION Diabetes Self Management Education / Training (DSME/T) and Medical Nutrition Therapy (MNT): Initial Group DSME/MNT as authorized by payor, Follow-Up DSME/MNT as authorized by payor; DSME/T Content: Monitoring Diabete...    Patient was seen for 25 minutes face to face of which > 50% of appointment time was spent on counseling and coordination of care regarding the above.      Followup: Return in about 3 months (around 11/21/2018), or if symptoms worsen or fail to improve.    Please note that this dictation was created using voice recognition software. I have made every reasonable attempt to correct obvious errors, but I expect that there are errors of grammar and possibly content that I did not discover before finalizing the note.

## 2018-08-21 NOTE — ASSESSMENT & PLAN NOTE
Has chronic diabetes. Follows with endocrinology now. A1c improved 6/10-8.3. She states that her numbers to fluctuate. Currently using Humalog and Lantus.

## 2018-08-21 NOTE — ASSESSMENT & PLAN NOTE
This is a 72-year-old female who is here today to discuss depression. She had it for many years. Denies any anxiety. She states she is [employer]. Doesn't go to [employer] on a regular basis. States that she is angry with God and some of the things that he has taken away from her and her life. She denies any homicidal or suicidal ideations because of her [place]. The past she was on Paxil as well as other medications. Tried Wellbutrin but the generic product gave her side effects. She would like to be referred to psychiatry.

## 2018-08-31 ENCOUNTER — HOSPITAL ENCOUNTER (OUTPATIENT)
Dept: BEHAVIORAL HEALTH | Facility: MEDICAL CENTER | Age: 73
End: 2018-08-31
Attending: PSYCHIATRY & NEUROLOGY
Payer: MEDICARE
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
## 2018-09-04 ENCOUNTER — OFFICE VISIT (OUTPATIENT)
Dept: BEHAVIORAL HEALTH | Facility: PHYSICIAN GROUP | Age: 73
End: 2018-09-04
Payer: MEDICARE

## 2018-09-04 VITALS
WEIGHT: 220 LBS | HEIGHT: 62 IN | BODY MASS INDEX: 40.48 KG/M2 | SYSTOLIC BLOOD PRESSURE: 134 MMHG | DIASTOLIC BLOOD PRESSURE: 78 MMHG | HEART RATE: 78 BPM

## 2018-09-04 DIAGNOSIS — F33.2 MAJOR DEPRESSIVE DISORDER, RECURRENT SEVERE WITHOUT PSYCHOTIC FEATURES (HCC): ICD-10-CM

## 2018-09-04 PROBLEM — F33.0 MILD EPISODE OF RECURRENT MAJOR DEPRESSIVE DISORDER (HCC): Status: RESOLVED | Noted: 2018-08-21 | Resolved: 2018-09-04

## 2018-09-04 PROCEDURE — 99204 OFFICE O/P NEW MOD 45 MIN: CPT | Performed by: PSYCHIATRY & NEUROLOGY

## 2018-09-04 RX ORDER — BUPROPION HCL 150 MG
150 TABLET, EXTENDED RELEASE 24 HR ORAL EVERY MORNING
Qty: 30 TAB | Refills: 0 | Status: SHIPPED | OUTPATIENT
Start: 2018-09-04 | End: 2018-10-15 | Stop reason: SDUPTHER

## 2018-09-04 ASSESSMENT — PATIENT HEALTH QUESTIONNAIRE - PHQ9
5. POOR APPETITE OR OVEREATING: 3 - NEARLY EVERY DAY
SUM OF ALL RESPONSES TO PHQ QUESTIONS 1-9: 19
CLINICAL INTERPRETATION OF PHQ2 SCORE: 5

## 2018-09-04 NOTE — PROGRESS NOTES
PARTIAL HOSPITALIZATION PROGRAM INITIAL PSYCHIATRY EVALUATION      Chief Complaint   Patient presents with   • Depression         History Of Present Illness:  Geraldine Scott is a 72 y.o. old female with history of diabetes mellitus type 1, hypertension, dyslipidemia, hypothyroidism referred by Clay Maguire P.A.-C for depression evaluation.  She reports struggling with depression on and off since her late 30s/early 40s.  She has been off antidepressants for over 10 years but has noticed on and off worsening depression since she moved from [place] to [place] in 2014.  She moved to be closer to her father and 2 sisters and moved to [place] in December 2017 to be in a financially better position.  She has been regretting her moved to [place] and would now like to move to [place] to be closer again to family.  She has lately noticed worsening depressive symptoms.  She endorses feeling depressed, irritable and angry.  She has noticed a decline in her motivation and energy levels.  She has been sleeping and eating a lot and has gained a lot of weight which is not good for her diabetes.  She has been taking her insulin as prescribed but is not eating healthy like before.  She has a fitness center close to her home but lacks the motivation to go and exercise.  She denies any current psychosocial or relationship stressors.  She works part-time from home and has lately noticed that she has been making some small mistakes at home.  She does not have any family or friends in town and has been struggling to make any new connections here.  She does like taking care of her cats and takes them once a week to a local pet store.  She denies any symptoms consistent with hypomania, benjamin or psychosis.  She denies struggling with anxiety on a daily basis but does get stressed when she is in big crowds and avoids them as much as she can.  She has also noticed more struggles in decision making lately.  She does not feel confident  in the decisions that she makes for herself.  She endorses passive thoughts of death on a regular basis but denies any recent suicide attempt or having current active thoughts, intent or plan of wanting to hurt herself or others.  She states that her Confucianism jo-ann is her biggest protective factor.  She has a strong belief and the devil and but not intentionally hurt herself because she is afraid of health.      Current psychiatric medications - None     Past Psychiatric History:  Prior psychiatric hospitalization - She reports 2 inpatient hospitalization in the 80s for depression and suicide attempts after a long term relationship ended.  Self harm/suicide attempt - She reports 2 prior suicide attempts in the 80s by overdose on aspirin and carbon monoxide poisoning  Previous medication trials - Wellbutrin, Paxil.  She has also tried several tricyclic antidepressants in the 80s but endorses side effects from them.    Past Medical/Surgical History:  Past Medical History:   Diagnosis Date   • Depression    • Diabetes mellitus type 1 (HCC)    • Dyslipidemia    • Obesity    • Suicide attempt (HCC)     x 2 in 80's   • Thyroid disease      Past Surgical History:   Procedure Laterality Date   • ABDOMINAL HYSTERECTOMY TOTAL     • BLADDER SUSPENSION         Family Psychiatric History:  Father () - possible depression    Substance Use/Addiction History:  Alcohol - Drinks 0-1 beers a month  Nicotine - Denies  Illicit drugs - Denies    Social History:  She is currently single and lives alone in [place].  She has been  and  ×1.  She had a daughter who ran away when she was 17 years old and she has not had any contact with her since then.  She was in a long-term relationship for over 15 years which ended in early 80s.  She currently works part-time as a .  Her mother is  and father lives in [place].  She is to oldest of 4 siblings.  Her younger siblings live in [place] and  "[place].    Allergies:  Morphine and Sulfa drugs    Medications:  Current Outpatient Prescriptions   Medication Sig Dispense Refill   • WELLBUTRIN  MG XL tablet Take 1 Tab by mouth every morning. 30 Tab 0   • levothyroxine (SYNTHROID) 112 MCG Tab TAKE ONE TABLET BY MOUTH IN THE MORNING on an empty stomach 90 Tab 0   • atorvastatin (LIPITOR) 20 MG Tab Take 1 Tab by mouth every bedtime. 30 Tab 6   • lisinopril (PRINIVIL) 20 MG Tab Take 1 Tab by mouth every day. 30 Tab 0   • HUMALOG KWIKPEN 100 UNIT/ML Solution Pen-injector injection      • BD PEN NEEDLE DELMI U/F 32G X 4 MM Misc      • Blood Glucose Monitoring Suppl Supplies Misc Chief Trunk contour glucometer test strips for BG check 4-5 times a day 150 Each 6   • glucose blood strip 1 Each by Other route as needed.     • insulin glargine (LANTUS) 100 UNIT/ML Solution Inject  as instructed every evening.     • cholestyramine light (PREVALITE) 4 GM/DOSE powder Take  by mouth.     • vitamin D, Ergocalciferol, (DRISDOL) 29845 units Cap capsule Take  by mouth every 7 days.       No current facility-administered medications for this visit.        Review of Symptoms:  Constitutional - Positive for fatigue, increased appetite, weight gain  Eyes - Negative for blurry vision  HEENT - Negative for sore throat  Respiratory - Negative for shortness of breath, cough  CVS - Negative for chest pain, palpitations  GI - Negative for nausea, vomiting, abdominal pain, diarrhea, constipation  Skin - Negative for rash  Musculoskeletal - Negative for back pain  Neurological - Negative for headaches  Psychiatric - Positive for depression, excessive sleep    Physical Examination:  Vital signs: /78   Pulse 78   Ht 1.575 m (5' 2\")   Wt 99.8 kg (220 lb)   BMI 40.24 kg/m²     Musculoskeletal: Normal gait. No abnormal movements.     Mental Status Evaluation:   General: Elderly white female, dressed in casual attire, good grooming and hygiene, in no apparent distress, calm and " "cooperative, good eye contact, no psychomotor agitation or retardation  Orientation: Alert and oriented to person, place and time  Recent and remote memory: Intact  Attention span and concentration: Intact  Speech: Spontaneous, normal rate, rhythm and tone  Thought Process: Linear, logical and goal directed  Thought Content: Denies suicidal or homicidal ideations, intent or plan  Perception: Denies auditory or visual hallucinations. No delusions noted  Associations: Intact  Language: Appropriate  Fund of knowledge and vocabulary: Adequate  Mood: \"nervous\"  Affect: Dysphoric at times, mood congruent  Insight: Good  Judgment: Good    Depression screening:  Depression Screen (PHQ-2/PHQ-9) 2/6/2018 9/4/2018   PHQ-2 Total Score 1 5   PHQ-9 Total Score 7 19     Interpretation of PHQ-9 Total Score   Score Severity   1-4 No Depression   5-9 Mild Depression   10-14 Moderate Depression   15-19 Moderately Severe Depression   20-27 Severe Depression    Medical Records/Labs/Diagnostic Tests Reviewed:  [place]  records - no prescribed controlled medications found in the last 1 year    Impression:  1. Major depressive disorder, recurrent, severe without psychotic symptoms    - Symptoms necessitating partial hospitialization treatment: Depression, increased sleep, increased appetite, weight gain, difficulty making decisions, irritability, passive suicidal thoughts    Plan:  1. Admit to Partial Hospitalization Program on 9/6/18   - Daily group therapy daily,    - Psychoeducational groups and teaching forums per schedule,    - Active therapy daily,   - Individual/family counseling sessions with  per treatment plan  2. Medical screening/Physical exam per primary care provider or referring facility.  3. Start Wellbutrin  mg (brand name) in the morning for depression.  She denies a history of seizure disorder or eating disorder.  Discussed side effects including anxiety, agitation, psychomotor activation, decreased " appetite, weight loss, poor sleep, tachycardia, palpitations etc. She reports fecal incontinence with generic Wellbutrin and no similar side effects with the brand name.  I informed her that brand name might require prior authorization from her insurance for    Return to clinic in 1 week or sooner if symptoms worsen    The proposed treatment plan was discussed with the patient who was provided the opportunity to ask questions and make suggestions regarding alternative treatment. Patient verbalized understanding and expressed agreement with the plan.     Usha Rae M.D.  09/04/18    This note was created using voice recognition software (Dragon). The accuracy of the dictation is limited by the abilities of the software. I have reviewed the note prior to signing, however some errors in grammar and context are still possible. If you have any questions related to this note please do not hesitate to contact our office.     CC:  Clay Maguire P.A.-C.

## 2018-09-06 ENCOUNTER — HOSPITAL ENCOUNTER (OUTPATIENT)
Dept: BEHAVIORAL HEALTH | Facility: MEDICAL CENTER | Age: 73
End: 2018-09-06
Attending: PSYCHIATRY & NEUROLOGY
Payer: MEDICARE

## 2018-09-06 ENCOUNTER — DOCUMENTATION (OUTPATIENT)
Dept: BEHAVIORAL HEALTH | Facility: PHYSICIAN GROUP | Age: 73
End: 2018-09-06

## 2018-09-06 DIAGNOSIS — F33.2 MAJOR DEPRESSIVE DISORDER, RECURRENT SEVERE WITHOUT PSYCHOTIC FEATURES (HCC): ICD-10-CM

## 2018-09-06 PROCEDURE — G0177 OPPS/PHP; TRAIN & EDUC SERV: HCPCS

## 2018-09-06 PROCEDURE — G0176 OPPS/PHP;ACTIVITY THERAPY: HCPCS

## 2018-09-06 PROCEDURE — G0410 GRP PSYCH PARTIAL HOSP 45-50: HCPCS

## 2018-09-06 NOTE — ADDENDUM NOTE
Encounter addended by: Latoya Mills R.N. on: 9/6/2018  3:44 PM<BR>    Actions taken: Care Plan modified, Care Plan progress modified, Sign clinical note, Administrative Information edited

## 2018-09-06 NOTE — BH THERAPY
Group Therapy Checklist  Attendance: Attended  Attendance Duration (min): 46-60  Number of Participants: 4  Program/Group: Partial Hospital Program  Topics Covered:  (creative arts)  Participation: Active verbal participation, Attentive  Affect/Mood Range: Normal range, Flexible  Affect/Mood Display: Congruent w/content  Cognition: Alert, Oriented  Evidence of Imminent Suicide Risk: No  Evidence of imminent homicide risk: No  Therapeutic Interventions: Leisure and Recreation skills, Socialization  Progress Toward Treatment Goal: Mild improvement

## 2018-09-06 NOTE — BH THERAPY
Group Therapy Checklist  Attendance: Attended  Attendance Duration (min): 60  Number of Participants: 4  Program/Group: Partial Hospital Program  Topics Covered: Goal setting  Participation: Active verbal participation  Affect/Mood Range: Normal range, Flexible  Affect/Mood Display: Congruent w/content  Cognition: Alert, Oriented  Evidence of Imminent Suicide Risk: No  Evidence of imminent homicide risk: No  Therapeutic Interventions: Goal-setting  Progress Toward Treatment Goal: Mild improvement

## 2018-09-06 NOTE — CARE PLAN
"Problem: Mood Instability Interfering with [facility]  Goal: Achieve stable functioning    Intervention: Individual Counseling Sessions   [facility]  Therapy Progress Note    Patient Name: Geraldine Scott  Patient MRN: [id number]  Today's Date: 9/6/2018     Type of session:Individual psychotherapy  Length of session: 30 minutes  Persons in attendance:Patient    Subjective/New Info: initial individual session. Developed treatment plan. Geraldine feels unmotivated, lamenting past decisions. She is looking to improve her mood and will discuss intake assessments at next session. For today, her goal is to walk a few minutes.    Objective/Observations:   Participation: Active verbal participation, Attentive and Engaged   Grooming: Casual and Neat   Cognition: Alert and Fully Oriented   Eye contact: Good   Mood: Depressed and Anxious   Affect: Flexible, Full range, Congruent with content and Anxious   Thought process: Logical and Goal-directed   Speech: Rate within normal limits and Volume within normal limits   Other:     Diagnoses:   1. Major depressive disorder, recurrent severe without psychotic features (HCC)         Current risk:   SUICIDE: Not applicable   Homicide: Not applicable   Self-harm: Not applicable   Relapse: Not applicable   Other:    Safety Plan reviewed? Not Indicated   If evidence of imminent risk is present, intervention/plan:     Therapeutic Intervention(s): Develop/modify treatment plan, Establish rapport and Supportive psychotherapy    Treatment Goal(s)/Objective(s) addressed: establish rapport,  Develop treatment plan.      Progress toward Treatment Goals: Return to baseline    Plan:  - Continue Partial Hospital Program  - \"Homework\" recommendation: intake assessments  - Next appointment scheduled:  9/7/2018  - Patient is in agreement with the above plan:  YES    Latoya Mills R.N.  9/6/2018                                       "

## 2018-09-06 NOTE — BH THERAPY
Group Therapy Checklist  Attendance: Attended  Attendance Duration (min): 60  Number of Participants: 11  Program/Group: Partial Hospital Program  Topics Covered: Dual Diagnosis  Participation: Active verbal participation  Affect/Mood Range: Normal range, Flexible  Affect/Mood Display: Congruent w/content  Cognition: Alert, Oriented  Evidence of Imminent Suicide Risk: No  Evidence of imminent homicide risk: No  Therapeutic Interventions: Cognitive clarification, Relapse prevention  Progress Toward Treatment Goal: Mild improvement

## 2018-09-06 NOTE — BH THERAPY
Group Therapy Checklist  Attendance: Attended  Attendance Duration (min):  (90 min.)  Number of Participants: 11  Program/Group: Partial Hospital Program  Topics Covered:  (Group Therapy)  Participation: Active verbal participation (Pt shared her anger about God.  She seemed open to feedback.)  Affect/Mood Range: Constricted  Affect/Mood Display: Congruent w/content, Angry  Cognition: Oriented, Alert  Evidence of Imminent Suicide Risk: No  Evidence of imminent homicide risk: No  Therapeutic Interventions: Emotion clarification, Cognitive clarification  Progress Toward Treatment Goal: Moderate improvement

## 2018-09-07 ENCOUNTER — TELEPHONE (OUTPATIENT)
Dept: BEHAVIORAL HEALTH | Facility: MEDICAL CENTER | Age: 73
End: 2018-09-07

## 2018-09-07 ENCOUNTER — HOSPITAL ENCOUNTER (OUTPATIENT)
Dept: BEHAVIORAL HEALTH | Facility: MEDICAL CENTER | Age: 73
End: 2018-09-07
Attending: PSYCHIATRY & NEUROLOGY
Payer: MEDICARE

## 2018-09-07 DIAGNOSIS — F33.2 MAJOR DEPRESSIVE DISORDER, RECURRENT SEVERE WITHOUT PSYCHOTIC FEATURES (HCC): ICD-10-CM

## 2018-09-07 PROCEDURE — G0176 OPPS/PHP;ACTIVITY THERAPY: HCPCS

## 2018-09-07 PROCEDURE — G0177 OPPS/PHP; TRAIN & EDUC SERV: HCPCS

## 2018-09-07 PROCEDURE — G0410 GRP PSYCH PARTIAL HOSP 45-50: HCPCS

## 2018-09-07 NOTE — BH THERAPY
Group Therapy Checklist  Attendance: Attended  Attendance Duration (min): 60  Number of Participants: 6  Program/Group: Partial Hospital Program  Topics Covered:  (creative arts)  Participation: Active verbal participation, Attentive  Affect/Mood Range: Normal range, Flexible  Affect/Mood Display: Congruent w/content  Cognition: Alert, Oriented  Evidence of Imminent Suicide Risk: No  Evidence of imminent homicide risk: No  Therapeutic Interventions: Socialization, Leisure and Recreation skills  Progress Toward Treatment Goal: Moderate improvement

## 2018-09-07 NOTE — BH THERAPY
Group Therapy Checklist  Attendance: Attended  Attendance Duration (min): 60  Number of Participants: 6  Program/Group: Partial Hospital Program  Topics Covered:  (happiness)  Participation: Active verbal participation  Affect/Mood Range: Normal range, Flexible  Affect/Mood Display: Congruent w/content  Cognition: Alert, Oriented  Evidence of Imminent Suicide Risk: No  Evidence of imminent homicide risk: No  Therapeutic Interventions: Emotion clarification, Values clarification  Progress Toward Treatment Goal: Moderate improvement

## 2018-09-07 NOTE — TELEPHONE ENCOUNTER
[facility]    Treatment Team Staffing    Patient Name: Geraldine Scott Program: [facility] Date: 9/7/2018     Attendees:  Nadine Chapa RN, [facility]; MINH Rivera, [facility]; Latoya Mills RN and Usha Rae MD    Patient's Progress toward Goals Listed on the Treatment Plan: developed treatment plan, feeling negative and full of regrets.     1. Client's Participation When in Attendance Was: Active in a Positive Way    2. Counselor's Evaluation of Client's Progress: Positive Movement    3. Patient is attending group and individual sessions and is progressing well toward the treatment goals: yes      YES NO   A. Relapse During Program []  [x]    B. Requires physician review []  [x]    C. Referral to program inappropriate []  [x]    D. Non compliance with Treatment Plan []  [x]    E. Early treatment termination (lack of attendance) []  [x]     []  []      Comments: Geraldine has not started medications yet, waiting for PAR for brand name.     Treatment Plan Review: - Continue Partial Hospital Program  - Next appointment scheduled:  9/10/2018  - Patient is in agreement with the above plan:  YES

## 2018-09-07 NOTE — BH THERAPY
Group Therapy Checklist  Attendance: Attended  Attendance Duration (min): 60  Number of Participants: 13  Program/Group: Partial Hospital Program  Topics Covered: Boundaries  Participation: Active verbal participation  Affect/Mood Range: Normal range, Flexible  Affect/Mood Display: Congruent w/content  Cognition: Alert, Oriented  Evidence of Imminent Suicide Risk: No  Evidence of imminent homicide risk: No  Therapeutic Interventions: Cognitive clarification  Progress Toward Treatment Goal: Moderate improvement

## 2018-09-07 NOTE — BH THERAPY
Group Therapy Checklist  Attendance: Attended  Attendance Duration (min):  (90 min.)  Number of Participants: 13  Program/Group: Partial Hospital Program  Topics Covered: Weekend planning  Participation: Limited verbal participation (Pt seems to be somewhat pessimistic about her experiences.)  Affect/Mood Range: Constricted  Affect/Mood Display: Congruent w/content, Sad  Cognition: Oriented, Alert  Evidence of Imminent Suicide Risk: No  Evidence of imminent homicide risk: No  Therapeutic Interventions: Emotion clarification, Cognitive clarification  Progress Toward Treatment Goal: Moderate improvement

## 2018-09-10 ENCOUNTER — HOSPITAL ENCOUNTER (OUTPATIENT)
Dept: BEHAVIORAL HEALTH | Facility: MEDICAL CENTER | Age: 73
End: 2018-09-10
Attending: PSYCHIATRY & NEUROLOGY
Payer: MEDICARE

## 2018-09-10 DIAGNOSIS — F33.2 MAJOR DEPRESSIVE DISORDER, RECURRENT SEVERE WITHOUT PSYCHOTIC FEATURES (HCC): ICD-10-CM

## 2018-09-10 PROCEDURE — G0411 INTER ACTIVE GRP PSYCH PARTI: HCPCS

## 2018-09-10 PROCEDURE — G0176 OPPS/PHP;ACTIVITY THERAPY: HCPCS

## 2018-09-10 PROCEDURE — G0177 OPPS/PHP; TRAIN & EDUC SERV: HCPCS

## 2018-09-10 NOTE — CARE PLAN
Problem: Mood Instability Interfering with Adl[employer]S  Goal: Achieve stable functioning    Intervention: Individual Counseling Sessions   [facility]  Therapy Progress Note    Patient Name: Geraldine Scott  Patient MRN: [id number]  Today's Date: 9/10/2018     Type of session:Individual psychotherapy  Length of session: 30 minutes  Persons in attendance:Patient    Subjective/New Info: individual session. Had breakfast with newcomers group. Making [place]/ [place] her home; she is reconsidering buying condo in [place].  Geraldine had initially turned down the offer, now having doubts. Discussed her history of indecisiveness and regrets. She is on leave from work. Has eye doctor appointment on Thursday ~ no program on that day as she will take a cab to and from the appt. She still has not received medications and has not heard back from her pharmacy. Would like to start exercising; strategies discussed.     Objective/Observations:   Participation: Active verbal participation and Attentive   Grooming: Casual and Neat   Cognition: Alert and Fully Oriented   Eye contact: Good   Mood: Depressed and Anxious   Affect: Flexible, Full range and Congruent with content   Thought process: Logical and Goal-directed   Speech: Rate within normal limits and Volume within normal limits   Other:     Diagnoses: F33.2    Current risk:   SUICIDE: Not applicable   Homicide: Not applicable   Self-harm: Not applicable   Relapse: Not applicable   Other:    Safety Plan reviewed? Not Indicated   If evidence of imminent risk is present, intervention/plan:     Therapeutic Intervention(s): Clarify:  Clarify thoughts, Review treatment plan, Self-care skills, Stressors assessed and Supportive psychotherapy    Treatment Goal(s)/Objective(s) addressed: depression and mood management     Progress toward Treatment Goals: Mild improvement    Plan:  - Continue Partial Hospital Program  - Next appointment scheduled:  9/11/2018  - Patient is in agreement  with the above plan:  YES    Latoya Mills R.N.  9/10/2018

## 2018-09-10 NOTE — BH THERAPY
Group Therapy Checklist  Attendance: Attended  Attendance Duration (min): 60  Number of Participants: 5  Program/Group: Partial Hospital Program  Topics Covered: Commitment to change  Participation: Active verbal participation  Affect/Mood Range: Normal range, Flexible  Affect/Mood Display: Congruent w/content  Cognition: Alert, Oriented  Evidence of Imminent Suicide Risk: No  Evidence of imminent homicide risk: No  Therapeutic Interventions: Cognitive clarification, Values clarification  Progress Toward Treatment Goal: Moderate improvement

## 2018-09-10 NOTE — BH THERAPY
Group Therapy Checklist  Attendance: Attended  Attendance Duration (min): 60  Number of Participants: 10  Program/Group: Partial Hospital Program  Topics Covered: Recovery Planning  Affect/Mood Range: Normal range, Flexible  Affect/Mood Display: Congruent w/content  Cognition: Alert, Oriented  Evidence of Imminent Suicide Risk: No  Evidence of imminent homicide risk: No  Therapeutic Interventions: Cognitive clarification, Relapse prevention  Progress Toward Treatment Goal: Mild improvement

## 2018-09-11 ENCOUNTER — HOSPITAL ENCOUNTER (OUTPATIENT)
Dept: BEHAVIORAL HEALTH | Facility: MEDICAL CENTER | Age: 73
End: 2018-09-11
Attending: PSYCHIATRY & NEUROLOGY
Payer: MEDICARE

## 2018-09-11 VITALS
BODY MASS INDEX: 37.49 KG/M2 | DIASTOLIC BLOOD PRESSURE: 77 MMHG | SYSTOLIC BLOOD PRESSURE: 119 MMHG | HEART RATE: 90 BPM | WEIGHT: 205 LBS

## 2018-09-11 DIAGNOSIS — F33.2 MAJOR DEPRESSIVE DISORDER, RECURRENT SEVERE WITHOUT PSYCHOTIC FEATURES (HCC): ICD-10-CM

## 2018-09-11 PROCEDURE — G0410 GRP PSYCH PARTIAL HOSP 45-50: HCPCS

## 2018-09-11 PROCEDURE — 99214 OFFICE O/P EST MOD 30 MIN: CPT

## 2018-09-11 PROCEDURE — G0177 OPPS/PHP; TRAIN & EDUC SERV: HCPCS

## 2018-09-11 PROCEDURE — 99213 OFFICE O/P EST LOW 20 MIN: CPT | Performed by: PSYCHIATRY & NEUROLOGY

## 2018-09-11 PROCEDURE — G0176 OPPS/PHP;ACTIVITY THERAPY: HCPCS

## 2018-09-11 NOTE — PROGRESS NOTES
PARTIAL HOSPITALIZATION PROGRAM FOLLOW-UP NOTE      Chief Complaint   Patient presents with   • Follow-Up     depression         History Of Present Illness:  Geraldine Scott is a 72 y.o. old female with major depressive disorder, diabetes mellitus type 1, hypertension, dyslipidemia, hypothyroidism seen today as PHP follow up, was last seen 1 week ago.  She reports struggling with depression and has not been able to start Wellbutrin.  She has $100 co-pay with the brand name Wellbutrin but she does not agree with that and plans on calling her insurance today to follow-up on this.  She is still struggling with lack of motivation and energy and feeling overwhelmed easily.  She denies unprovoked crying spells.  She is having a hard time looking in the future.  She states that she wanted to live until 52 and she is way past those years and has a hard time thinking about the future.  She denies any active thoughts, intent or plan of wanting to hurt herself or others.  She is no longer dwelling on death and thinks about it once in a while.  She denies any new psychosocial stressors.  She is still sleeping a lot but is trying to eat better and take care of her diabetes.    Social History:   She is currently single and lives alone in [place].  She has been  and  ×1.  She had a daughter who ran away when she was 17 years old and she has not had any contact with her since then. She was in a long-term relationship for over 15 years which ended in early 80s.  She currently works part-time as a .  Her mother is  and father lives in [place].  Her younger siblings live in [place] and [place].    Substance Use:  Alcohol - Denies   Nicotine - Denies  Illicit drugs - Denies    Medications:  Current Outpatient Prescriptions   Medication Sig Dispense Refill   • WELLBUTRIN  MG XL tablet Take 1 Tab by mouth every morning. 30 Tab 0   • levothyroxine (SYNTHROID) 112 MCG Tab TAKE ONE TABLET BY  MOUTH IN THE MORNING on an empty stomach 90 Tab 0   • HUMALOG KWIKPEN 100 UNIT/ML Solution Pen-injector injection      • BD PEN NEEDLE DELMI U/F 32G X 4 MM Misc      • atorvastatin (LIPITOR) 20 MG Tab Take 1 Tab by mouth every bedtime. 30 Tab 6   • Blood Glucose Monitoring Suppl Supplies Misc Blood cell Storage contour glucometer test strips for BG check 4-5 times a day 150 Each 6   • glucose blood strip 1 Each by Other route as needed.     • insulin glargine (LANTUS) 100 UNIT/ML Solution Inject  as instructed every evening.     • cholestyramine light (PREVALITE) 4 GM/DOSE powder Take  by mouth.     • vitamin D, Ergocalciferol, (DRISDOL) 79196 units Cap capsule Take  by mouth every 7 days.     • lisinopril (PRINIVIL) 20 MG Tab Take 1 Tab by mouth every day. 30 Tab 0     No current facility-administered medications for this encounter.        Review Of Systems:    Constitutional - Positive for fatigue  HEENT - Positive for right blurry vision   Respiratory - Negative for shortness of breath, cough  CVS - Negative for chest pain, palpitations  GI - Negative for nausea, vomiting, abdominal pain, diarrhea, constipation  Musculoskeletal - Negative for back pain  Neurological - Negative for headaches  Psychiatric - Positive for depression     Physical Examination:  Vital signs: /77   Pulse 90   Wt 93 kg (205 lb)   BMI 37.49 kg/m²     Musculoskeletal: Normal gait. No abnormal movements.     Mental Status Evaluation:   General: Elderly white female, dressed in casual attire, good grooming and hygiene, in no apparent distress, calm and cooperative, fair eye contact, no psychomotor agitation or retardation  Orientation: Alert and oriented to person, place and time  Recent and remote memory: Grossly intact  Attention span and concentration: Grossly intact  Speech: Spontaneous, normal rate, rhythm and tone  Thought Process: Linear, logical and goal directed  Thought Content: Denies suicidal or homicidal ideations, intent or  "plan  Perception: Denies auditory or visual hallucinations. No delusions noted  Associations: Intact  Language: Appropriate  Fund of knowledge and vocabulary: Grossly adequate  Mood: \"am okay\"  Affect: Restricted, mood congruent  Insight: Good  Judgment: Good      Impression:  1. Major depressive disorder, recurrent, severe without psychotic symptoms    Plan:  1.  Start Wellbutrin  mg (brand name) in the morning for depression.  She plans on calling her insurance today to discuss brand name medication co-pay issues.  If she is unable to resolve she is willing to try the generic formulation again.     I certify that continued Partial Hospitalization services are medically necessary to improve and/or maintain the patient's condition, functional level, prevent relapse or hospitalization and that this could not be done at a less intensive level of care due to:     [x] Persistence of significant psychiatric symptomology.  [x] Severity of patient's condition necessitates a continued intense level of service responsive to the patient's problems and accommodated by the function of the treatment program.    [x] Individualized, active treatment plan goals which are directed toward alleviation of the symptomology that precipitated the admission have not yet been attained.     Follow up in 1 week or sooner if symptoms worsen    The proposed treatment plan was discussed with the patient who was provided the opportunity to ask questions and make suggestions regarding alternative treatment. Patient verbalized understanding and expressed agreement with the plan.     Usha Rae M.D.  09/11/18    This note was created using voice recognition software (Dragon). The accuracy of the dictation is limited by the abilities of the software. I have reviewed the note prior to signing, however some errors in grammar and context are still possible. If you have any questions related to this note please do not hesitate to contact our " office.      19-Apr-2018 10:00

## 2018-09-11 NOTE — BH THERAPY
Group Therapy Checklist  Attendance: Attended  Attendance Duration (min):  (60 min.)  Number of Participants: 12  Program/Group: Partial Hospital Program  Topics Covered: Grief & Loss  Participation: Limited verbal participation, Attentive  Affect/Mood Range: Constricted  Affect/Mood Display: Congruent w/content  Cognition: Oriented, Alert  Evidence of Imminent Suicide Risk: No  Evidence of imminent homicide risk: No  Therapeutic Interventions: Psychoeducation re: (Comment), Emotion clarification  Progress Toward Treatment Goal: Moderate improvement

## 2018-09-11 NOTE — BH THERAPY
Group Therapy Checklist  Attendance: Attended  Attendance Duration (min): 90  Number of Participants: 12  Program/Group: Partial Hospital Program  Topics Covered:  (process group)  Participation: Active verbal participation, Attentive, Supportive to other group members, Open to feedback  Affect/Mood Range: Normal range, Flexible  Affect/Mood Display: Congruent w/content  Cognition: Alert, Oriented  Evidence of Imminent Suicide Risk: No  Evidence of imminent homicide risk: No  Therapeutic Interventions: Emotion clarification, Supportive psychotherapy  Progress Toward Treatment Goal: Mild improvement  Processed grief and loss, expressed emotional responses and reflected on life cycle. Receptive to peer support.

## 2018-09-11 NOTE — BH THERAPY
Group Therapy Checklist  Attendance: Attended  Attendance Duration (min):  (60 min.)  Number of Participants: 4  Program/Group: Partial Hospital Program  Topics Covered: Relaxation tech's  Participation: Active verbal participation  Affect/Mood Range: Normal range  Affect/Mood Display: Congruent w/content  Cognition: Oriented, Alert  Evidence of Imminent Suicide Risk: No  Evidence of imminent homicide risk: No  Therapeutic Interventions: Leisure and Recreation skills  Progress Toward Treatment Goal: Moderate improvement

## 2018-09-11 NOTE — BH THERAPY
Group Therapy Checklist  Attendance: Attended  Attendance Duration (min): 60  Number of Participants: 4  Program/Group: Partial Hospital Program  Topics Covered: Sleep Hygiene  Participation: Active verbal participation, Attentive  Affect/Mood Range: Normal range, Flexible  Affect/Mood Display: Congruent w/content  Cognition: Alert, Oriented  Evidence of Imminent Suicide Risk: No  Evidence of imminent homicide risk: No  Therapeutic Interventions: Cognitive clarification  Progress Toward Treatment Goal: Mild improvement

## 2018-09-12 ENCOUNTER — HOSPITAL ENCOUNTER (OUTPATIENT)
Dept: BEHAVIORAL HEALTH | Facility: MEDICAL CENTER | Age: 73
End: 2018-09-12
Attending: PSYCHIATRY & NEUROLOGY
Payer: MEDICARE

## 2018-09-12 DIAGNOSIS — F33.2 MAJOR DEPRESSIVE DISORDER, RECURRENT SEVERE WITHOUT PSYCHOTIC FEATURES (HCC): ICD-10-CM

## 2018-09-12 PROCEDURE — G0411 INTER ACTIVE GRP PSYCH PARTI: HCPCS

## 2018-09-12 PROCEDURE — G0177 OPPS/PHP; TRAIN & EDUC SERV: HCPCS

## 2018-09-12 PROCEDURE — G0176 OPPS/PHP;ACTIVITY THERAPY: HCPCS

## 2018-09-12 NOTE — BH THERAPY
Group Therapy Checklist  Attendance: Attended  Attendance Duration (min): 60  Number of Participants: 4  Program/Group: Partial Hospital Program  Topics Covered:  (creative art)  Participation: Active verbal participation  Affect/Mood Range: Normal range, Flexible  Affect/Mood Display: Congruent w/content  Cognition: Alert, Oriented  Evidence of Imminent Suicide Risk: No  Evidence of imminent homicide risk: No  Therapeutic Interventions: Socialization  Progress Toward Treatment Goal: Mild improvement

## 2018-09-12 NOTE — BH THERAPY
Group Therapy Checklist  Attendance: Attended  Attendance Duration (min): 60  Number of Participants: 4  Program/Group: Partial Hospital Program  Topics Covered: Pain vs. Suffering  Participation: Active verbal participation, Attentive  Affect/Mood Range: Normal range, Flexible  Affect/Mood Display: Congruent w/content  Cognition: Alert, Oriented  Evidence of Imminent Suicide Risk: No  Evidence of imminent homicide risk: No  Therapeutic Interventions: Cognitive clarification  Progress Toward Treatment Goal: Mild improvement

## 2018-09-12 NOTE — BH THERAPY
Group Therapy Checklist  Attendance: Attended  Attendance Duration (min): 60  Number of Participants: 12  Program/Group: Partial Hospital Program  Topics Covered: Cognitive distortions  Participation: Active verbal participation, Attentive  Affect/Mood Range: Normal range, Flexible  Affect/Mood Display: Congruent w/content  Cognition: Alert, Oriented  Evidence of Imminent Suicide Risk: No  Evidence of imminent homicide risk: No  Therapeutic Interventions: Cognitive clarification, Cognitive modification  Progress Toward Treatment Goal: Mild improvement

## 2018-09-13 ENCOUNTER — TELEPHONE (OUTPATIENT)
Dept: BEHAVIORAL HEALTH | Facility: MEDICAL CENTER | Age: 73
End: 2018-09-13

## 2018-09-13 NOTE — TELEPHONE ENCOUNTER
[facility]    Treatment Team Staffing    Patient Name: Geraldine Scott Program: [facility] Date: 9/13/2018     Attendees:  Nadine Chapa RN, [facility]; MINH Rivera, [facility]; Latoya Mills RN and Usha Rae MD    Patient's Progress toward Goals Listed on the Treatment Plan: medication delay, able to identify her problems, very resistant to positive outlook. Not happy that she didn't die at age 52 but no active SI.     1. Client's Participation When in Attendance Was: Passive But Listening Actively    2. Counselor's Evaluation of Client's Progress: Positive Movement    3. Patient is attending group and individual sessions and is progressing well toward the treatment goals: yes      YES NO   A. Relapse During Program []  [x]    B. Requires physician review []  [x]    C. Referral to program inappropriate []  [x]    D. Non compliance with Treatment Plan []  [x]    E. Early treatment termination (lack of attendance) []  [x]       []  []      Comments: sees eye doctor today, trouble reading and seeing pages for forums. Hopeful that today things improve, states it's part of her diabetic care.     Treatment Plan Review: - Continue Partial Hospital Program  - Next appointment scheduled:  9/14/2018  - Patient is in agreement with the above plan:  YES

## 2018-09-14 ENCOUNTER — HOSPITAL ENCOUNTER (OUTPATIENT)
Dept: BEHAVIORAL HEALTH | Facility: MEDICAL CENTER | Age: 73
End: 2018-09-14
Attending: PSYCHIATRY & NEUROLOGY
Payer: MEDICARE

## 2018-09-14 DIAGNOSIS — F33.2 MAJOR DEPRESSIVE DISORDER, RECURRENT SEVERE WITHOUT PSYCHOTIC FEATURES (HCC): ICD-10-CM

## 2018-09-14 PROCEDURE — G0410 GRP PSYCH PARTIAL HOSP 45-50: HCPCS

## 2018-09-14 PROCEDURE — G0177 OPPS/PHP; TRAIN & EDUC SERV: HCPCS

## 2018-09-14 PROCEDURE — G0176 OPPS/PHP;ACTIVITY THERAPY: HCPCS

## 2018-09-14 NOTE — BH THERAPY
Group Therapy Checklist  Attendance: Attended  Attendance Duration (min): 60  Number of Participants: 4  Program/Group: Partial Hospital Program  Topics Covered:  (creative art)  Participation: Active verbal participation  Affect/Mood Range: Normal range, Flexible  Affect/Mood Display: Congruent w/content  Cognition: Alert, Oriented  Evidence of Imminent Suicide Risk: No  Evidence of imminent homicide risk: No  Therapeutic Interventions: Socialization, Leisure and Recreation skills  Progress Toward Treatment Goal: Mild improvement  Active participation in art therapy.

## 2018-09-14 NOTE — BH THERAPY
Group Therapy Checklist  Attendance: Attended  Attendance Duration (min): 90  Number of Participants: 13  Program/Group: Partial Hospital Program  Topics Covered:  (process group)  Participation: Active verbal participation, Attentive, Supportive to other group members, Open to feedback  Affect/Mood Range: Normal range, Flexible  Affect/Mood Display: Congruent w/content  Cognition: Alert, Oriented  Evidence of Imminent Suicide Risk: No  Evidence of imminent homicide risk: No  Therapeutic Interventions: Emotion clarification, Supportive psychotherapy  Progress Toward Treatment Goal: Mild improvement  Processed weekend plans and included use of emotional skills and tools of recovery. Receptive to peer support.

## 2018-09-14 NOTE — BH THERAPY
Group Therapy Checklist  Attendance: Attended  Attendance Duration (min): 60  Number of Participants: 13  Program/Group: Partial Hospital Program  Topics Covered: ACT conept intro  Participation: Active verbal participation, Attentive  Affect/Mood Range: Normal range, Flexible  Affect/Mood Display: Congruent w/content  Cognition: Alert, Oriented  Evidence of Imminent Suicide Risk: No  Evidence of imminent homicide risk: No  Therapeutic Interventions: Cognitive clarification, Values clarification, Mindfulness exercise  Progress Toward Treatment Goal: Moderate improvement

## 2018-09-14 NOTE — BH THERAPY
Group Therapy Checklist  Attendance: Attended  Attendance Duration (min): 60  Number of Participants: 4  Program/Group: Partial Hospital Program  Topics Covered:  (balance, choice)  Participation: Active verbal participation  Affect/Mood Range: Normal range, Flexible  Affect/Mood Display: Congruent w/content  Cognition: Alert, Oriented  Evidence of Imminent Suicide Risk: No  Evidence of imminent homicide risk: No  Therapeutic Interventions: Cognitive clarification, Emotion clarification  Progress Toward Treatment Goal: Moderate improvement

## 2018-09-17 ENCOUNTER — HOSPITAL ENCOUNTER (OUTPATIENT)
Dept: BEHAVIORAL HEALTH | Facility: MEDICAL CENTER | Age: 73
End: 2018-09-17
Attending: PSYCHIATRY & NEUROLOGY
Payer: MEDICARE

## 2018-09-17 DIAGNOSIS — F33.2 MAJOR DEPRESSIVE DISORDER, RECURRENT SEVERE WITHOUT PSYCHOTIC FEATURES (HCC): ICD-10-CM

## 2018-09-17 PROCEDURE — G0177 OPPS/PHP; TRAIN & EDUC SERV: HCPCS

## 2018-09-17 PROCEDURE — G0176 OPPS/PHP;ACTIVITY THERAPY: HCPCS

## 2018-09-17 PROCEDURE — G0411 INTER ACTIVE GRP PSYCH PARTI: HCPCS

## 2018-09-17 NOTE — BH THERAPY
Group Therapy Checklist  Attendance: Attended  Attendance Duration (min): 60  Number of Participants: 12  Program/Group: Partial Hospital Program  Topics Covered: Assertiveness  Participation: Active verbal participation  Affect/Mood Range: Normal range, Flexible  Affect/Mood Display: Congruent w/content  Cognition: Alert, Oriented  Evidence of Imminent Suicide Risk: No  Evidence of imminent homicide risk: No  Therapeutic Interventions: Cognitive clarification, Communication skills  Progress Toward Treatment Goal: Moderate improvement

## 2018-09-17 NOTE — BH THERAPY
Group Therapy Checklist  Attendance: Attended  Attendance Duration (min): 60  Number of Participants: 5  Program/Group: Partial Hospital Program  Topics Covered: [place] kindness  Participation: Active verbal participation  Affect/Mood Range: Normal range, Flexible  Affect/Mood Display: Congruent w/content  Cognition: Alert, Oriented  Evidence of Imminent Suicide Risk: No  Evidence of imminent homicide risk: No  Therapeutic Interventions: Emotion clarification, Self-care skills  Progress Toward Treatment Goal: Moderate improvement

## 2018-09-17 NOTE — BH THERAPY
Group Therapy Checklist  Attendance: Attended  Attendance Duration (min): 60  Number of Participants: 4  Program/Group: Partial Hospital Program  Topics Covered:  (creative art)  Participation: Active verbal participation  Affect/Mood Range: Normal range, Flexible  Affect/Mood Display: Congruent w/content  Cognition: Alert, Oriented  Evidence of Imminent Suicide Risk: No  Evidence of imminent homicide risk: No  Therapeutic Interventions: Socialization, Leisure and Recreation skills  Progress Toward Treatment Goal: Mild improvement

## 2018-09-18 ENCOUNTER — HOSPITAL ENCOUNTER (OUTPATIENT)
Dept: BEHAVIORAL HEALTH | Facility: MEDICAL CENTER | Age: 73
End: 2018-09-18
Attending: PSYCHIATRY & NEUROLOGY
Payer: MEDICARE

## 2018-09-18 VITALS
HEIGHT: 62 IN | HEART RATE: 84 BPM | WEIGHT: 205 LBS | DIASTOLIC BLOOD PRESSURE: 87 MMHG | SYSTOLIC BLOOD PRESSURE: 133 MMHG | BODY MASS INDEX: 37.73 KG/M2

## 2018-09-18 DIAGNOSIS — F33.1 MDD (MAJOR DEPRESSIVE DISORDER), RECURRENT EPISODE, MODERATE (HCC): ICD-10-CM

## 2018-09-18 PROCEDURE — G0410 GRP PSYCH PARTIAL HOSP 45-50: HCPCS

## 2018-09-18 PROCEDURE — 99213 OFFICE O/P EST LOW 20 MIN: CPT | Performed by: PSYCHIATRY & NEUROLOGY

## 2018-09-18 PROCEDURE — G0177 OPPS/PHP; TRAIN & EDUC SERV: HCPCS

## 2018-09-18 PROCEDURE — 99215 OFFICE O/P EST HI 40 MIN: CPT | Mod: 25

## 2018-09-18 PROCEDURE — G0176 OPPS/PHP;ACTIVITY THERAPY: HCPCS

## 2018-09-18 NOTE — CARE PLAN
Problem: Mood Instability Interfering with Adl’S  Goal: Achieve stable functioning    Intervention: Individual Counseling Sessions   [facility]  Therapy Progress Note    Patient Name: Geraldine Scott  Patient MRN: [id number]  Today's Date: 9/18/2018     Type of session:Individual psychotherapy  Length of session: 30 minutes  Persons in attendance:Patient    Subjective/New Info: individual session. Processed move to [place] in November. She wants to be closer to her dad, he is 97. Geraldine started Wellbutrin and denies adverse side effects. She hopes to wake up happy; explored her role in happiness. She continues to regret past decisions but is moving forward. Mapped out IOP schedule as she will start next week.     Objective/Observations:   Participation: Limited verbal participation, Attentive, Engaged and Open to feedback   Grooming: Casual and Neat   Cognition: Alert and Fully Oriented   Eye contact: Good   Mood: Anxious   Affect: Flexible, Full range and Congruent with content   Thought process: Logical and Goal-directed   Speech: Rate within normal limits and Volume within normal limits   Other:     Diagnoses:   1. MDD (major depressive disorder), recurrent episode, moderate (HCC)         Current risk:   SUICIDE: Not applicable   Homicide: Not applicable   Self-harm: Not applicable   Relapse: Not applicable   Other:    Safety Plan reviewed? Not Indicated   If evidence of imminent risk is present, intervention/plan:     Therapeutic Intervention(s): Clarify:  Clarify thoughts, Goal-setting, Review treatment plan, Self-care skills and Supportive psychotherapy    Treatment Goal(s)/Objective(s) addressed: medication management, depression management, and mood stabilitiy     Progress toward Treatment Goals: Mild improvement    Plan:  - Continue Partial Hospital Program  - Next appointment scheduled:  9/19/2018  - Transition toward termination  - Patient is in agreement with the above plan:  YES    Latoya TRISTAN  NICO Mills  9/18/2018

## 2018-09-18 NOTE — BH THERAPY
Group Therapy Checklist  Attendance: Attended  Attendance Duration (min): 90  Number of Participants: 8  Program/Group: Partial Hospital Program  Topics Covered:  (process group)  Participation: Active verbal participation, Supportive to other group members, Attentive, Open to feedback  Affect/Mood Range: Normal range, Flexible  Affect/Mood Display: Congruent w/content  Cognition: Alert, Oriented  Evidence of Imminent Suicide Risk: No  Evidence of imminent homicide risk: No  Therapeutic Interventions: Emotion clarification, Supportive psychotherapy  Progress Toward Treatment Goal: Mild improvement  Geraldine processed her work boundaries, something she states that she learned the hard way. She explored the spiritual connection she has with nature. Receptive to peer support.

## 2018-09-18 NOTE — ADDENDUM NOTE
Encounter addended by: Latoya Mills R.N. on: 9/18/2018  3:33 PM<BR>    Actions taken: Care Plan problems brought forward, Sign clinical note, Care Plan progress modified

## 2018-09-18 NOTE — BH THERAPY
Group Therapy Checklist  Attendance: Attended  Attendance Duration (min):  (60 min.)  Number of Participants: 8  Program/Group: Partial Hospital Program  Topics Covered: Detachment  Participation: No verbal participation  Affect/Mood Range: Constricted  Affect/Mood Display: Congruent w/content  Cognition: Oriented, Alert  Evidence of Imminent Suicide Risk: No  Evidence of imminent homicide risk: No  Therapeutic Interventions: Psychoeducation re: (Comment), Emotion clarification  Progress Toward Treatment Goal: Mild improvement

## 2018-09-18 NOTE — BH THERAPY
Group Therapy Checklist  Attendance: Attended  Attendance Duration (min):  (60 min.)  Number of Participants: 3  Program/Group: Partial Hospital Program  Topics Covered: Relaxation tech's  Participation: Active verbal participation, Attentive  Affect/Mood Range: Constricted  Affect/Mood Display: Congruent w/content  Cognition: Alert, Oriented  Evidence of Imminent Suicide Risk: No  Evidence of imminent homicide risk: No  Therapeutic Interventions: Leisure and Recreation skills  Progress Toward Treatment Goal: Moderate improvement

## 2018-09-18 NOTE — PROGRESS NOTES
PARTIAL HOSPITALIZATION PROGRAM FOLLOW-UP NOTE      Chief Complaint   Patient presents with   • Follow-Up     depression         History Of Present Illness:  Geraldine Scott is a 72 y.o. old female with major depressive disorder, diabetes mellitus type 1, hypertension, dyslipidemia, hypothyroidism seen today as PHP follow up, was last seen 1 week ago.  She reports feeling okay in regards to her depression.  She was able to  her Wellbutrin and has been taking it for the last 5 days and so far has not noticed any efficacy or side effects.  She was however more forward looking as compared to her previous 2 visits with me.  She has been planning about her future and has been exploring her options about possibly moving to a [facility] community in [place] to be closer to her siblings and father who live in [place].  She is feeling more confident in regards to her decision.  She is still struggling with a lack of motivation and lack of energy.  She has been thinking about joining the gym close to her home and plans on doing that once she is done with the program.  She denies any new psychosocial stressors.  She denies feeling anxious or having panic attacks.  She denies any recent reckless or impulsive behavior.  She has been taking care of her diabetes and is eating better.  She has an infrequent thought about death but is no longer dwelling on it and denies any active thoughts of wanting to hurt herself or others.    Social History:   She is currently single and lives alone in [place].  She has been  and  ×1.  She had a daughter who ran away when she was 17 years old and has not had any contact with her since then. She was in a long-term relationship for over 15 years which ended in early 80s.  She currently works part-time as a .  Her mother is  and father lives in [place].  Her younger siblings live in [place] and [place].    Substance Use:  Alcohol - Denies   Nicotine  "- Denies  Illicit drugs - Denies    Medications:  Current Outpatient Prescriptions   Medication Sig Dispense Refill   • levothyroxine (SYNTHROID) 112 MCG Tab TAKE ONE TABLET BY MOUTH IN THE MORNING on an empty stomach 90 Tab 0   • atorvastatin (LIPITOR) 20 MG Tab Take 1 Tab by mouth every bedtime. 30 Tab 6   • insulin glargine (LANTUS) 100 UNIT/ML Solution Inject  as instructed every evening.     • lisinopril (PRINIVIL) 20 MG Tab Take 1 Tab by mouth every day. 30 Tab 0   • WELLBUTRIN  MG XL tablet Take 1 Tab by mouth every morning. 30 Tab 0   • HUMALOG KWIKPEN 100 UNIT/ML Solution Pen-injector injection      • BD PEN NEEDLE DELMI U/F 32G X 4 MM Misc      • Blood Glucose Monitoring Suppl Supplies Misc Awesome.me contour glucometer test strips for BG check 4-5 times a day 150 Each 6   • glucose blood strip 1 Each by Other route as needed.     • cholestyramine light (PREVALITE) 4 GM/DOSE powder Take  by mouth.     • vitamin D, Ergocalciferol, (DRISDOL) 77479 units Cap capsule Take  by mouth every 7 days.       No current facility-administered medications for this encounter.        Review Of Systems:    Constitutional - Positive for fatigue  HEENT - Positive for right blurry vision   Respiratory - Negative for shortness of breath, cough  CVS - Negative for chest pain, palpitations  GI - Negative for nausea, vomiting, abdominal pain, diarrhea, constipation  Musculoskeletal - Negative for back pain  Neurological - Negative for headaches  Psychiatric - Positive for depression     Physical Examination:  Vital signs: /87   Pulse 84   Ht 1.575 m (5' 2\")   Wt 93 kg (205 lb)   BMI 37.49 kg/m²     Musculoskeletal: Normal gait. No abnormal movements.     Mental Status Evaluation:   General: Elderly white female, dressed in casual attire, good grooming and hygiene, in no apparent distress, calm and cooperative, fair eye contact, no psychomotor agitation or retardation  Orientation: Alert and oriented to person, place and " "time  Recent and remote memory: Grossly intact  Attention span and concentration: Grossly intact  Speech: Spontaneous, normal rate, rhythm and tone  Thought Process: Linear, logical and goal directed  Thought Content: Denies suicidal or homicidal ideations, intent or plan  Perception: Denies auditory or visual hallucinations. No delusions noted  Associations: Intact  Language: Appropriate  Fund of knowledge and vocabulary: Grossly adequate  Mood: \"am okay\"  Affect: Dysphoric at times, mood congruent  Insight: Good  Judgment: Good      Impression:  1. Major depressive disorder, recurrent, moderate    Plan:  1. Continue Wellbutrin  mg (brand name) in the morning for depression.    2. Transition to Intensive Outpatient Program on 9/21/18    I certify that continued Partial Hospitalization services are medically necessary to improve and/or maintain the patient's condition, functional level, prevent relapse or hospitalization and that this could not be done at a less intensive level of care due to:     [x] Persistence of significant psychiatric symptomology.  [x] Severity of patient's condition necessitates a continued intense level of service responsive to the patient's problems and accommodated by the function of the treatment program.    [x] Individualized, active treatment plan goals which are directed toward alleviation of the symptomology that precipitated the admission have not yet been attained.     Follow up in 6-8 weeks or sooner if symptoms worsen    The proposed treatment plan was discussed with the patient who was provided the opportunity to ask questions and make suggestions regarding alternative treatment. Patient verbalized understanding and expressed agreement with the plan.     Usha Rae M.D.  09/18/18    This note was created using voice recognition software (Dragon). The accuracy of the dictation is limited by the abilities of the software. I have reviewed the note prior to signing, " however some errors in grammar and context are still possible. If you have any questions related to this note please do not hesitate to contact our office.

## 2018-09-19 ENCOUNTER — HOSPITAL ENCOUNTER (OUTPATIENT)
Dept: BEHAVIORAL HEALTH | Facility: MEDICAL CENTER | Age: 73
End: 2018-09-19
Attending: PSYCHIATRY & NEUROLOGY
Payer: MEDICARE

## 2018-09-19 DIAGNOSIS — F33.1 MDD (MAJOR DEPRESSIVE DISORDER), RECURRENT EPISODE, MODERATE (HCC): ICD-10-CM

## 2018-09-19 PROCEDURE — G0177 OPPS/PHP; TRAIN & EDUC SERV: HCPCS

## 2018-09-19 PROCEDURE — G0176 OPPS/PHP;ACTIVITY THERAPY: HCPCS

## 2018-09-19 PROCEDURE — G0411 INTER ACTIVE GRP PSYCH PARTI: HCPCS

## 2018-09-19 NOTE — BH THERAPY
0900 Attended 60 minutes  Topic: Chalk talk  Active verbal participation, attentive  Affect/mood: normal range, flexible  Affect/mood display: congruent  Cognition: alert, oriented  Evidence of suicide risk: No  Therapeutic intervention: cognitive clarification  Moderate improvement

## 2018-09-19 NOTE — BH THERAPY
1400 Attended 60 minutes  Topic: Caring for ourself  Active verbal participation, attentive  Affect/mood: normal range, flexible  Affect/mood display: congruent  Cognition: alert, oriented  Evidence of suicide risk: No  Therapeutic intervention: cognitive and emotional clarification, self care  Moderate improvement

## 2018-09-19 NOTE — BH THERAPY
1300 Attended 60 minutes  Topic: Creative Art  Active verbal participation, attentive  Affect/mood: normal range, flexible  Affect/mood display: congruent  Cognition: alert, oriented  Evidence of suicide risk: No  Therapeutic intervention: socialization  Moderate improvement

## 2018-09-20 ENCOUNTER — HOSPITAL ENCOUNTER (OUTPATIENT)
Dept: BEHAVIORAL HEALTH | Facility: MEDICAL CENTER | Age: 73
End: 2018-09-20
Attending: PSYCHIATRY & NEUROLOGY
Payer: MEDICARE

## 2018-09-20 ENCOUNTER — TELEPHONE (OUTPATIENT)
Dept: BEHAVIORAL HEALTH | Facility: MEDICAL CENTER | Age: 73
End: 2018-09-20

## 2018-09-20 DIAGNOSIS — F33.1 MDD (MAJOR DEPRESSIVE DISORDER), RECURRENT EPISODE, MODERATE (HCC): ICD-10-CM

## 2018-09-20 PROCEDURE — G0176 OPPS/PHP;ACTIVITY THERAPY: HCPCS

## 2018-09-20 PROCEDURE — G0177 OPPS/PHP; TRAIN & EDUC SERV: HCPCS

## 2018-09-20 PROCEDURE — G0410 GRP PSYCH PARTIAL HOSP 45-50: HCPCS

## 2018-09-20 NOTE — H&P
Group Therapy Checklist  Attendance: Attended  Attendance Duration (min):  (90 min.)  Number of Participants: 8  Program/Group: Partial Hospital Program  Topics Covered:  (Group Therapy)  Participation: Active verbal participation, Attentive (Pt shared about her upbringing and not having a warm and fuzzy mom/parent. )  Affect/Mood Range: Constricted  Affect/Mood Display: Congruent w/content  Cognition: Oriented, Alert  Evidence of Imminent Suicide Risk: No  Evidence of imminent homicide risk: No  Therapeutic Interventions: Emotion clarification, Cognitive clarification  Progress Toward Treatment Goal: Moderate improvement

## 2018-09-20 NOTE — BH THERAPY
Attended 120 minutes  Topic: the Shift  Active verbal participation, attentive  Affect/mood: normal range, flexible  Affect/mood display: congruent  Cognition: alert, oriented  Evidence of suicide risk: No  Therapeutic intervention: cognitive clarification  Moderate improvement

## 2018-09-20 NOTE — TELEPHONE ENCOUNTER
[facility]  TRANSFER/DISCHARGE SUMMARY FORM    HHPI / SCP: no Other Ins.: Medicare     Patient Name: Geraldine Scott  Admission Date: 18  Level of Care Attended:  Partial Hosp : 1945  Transfer/Discharge Date: MRN: [id number]  18       SIGNIFICANT FINDINGS/CLINICAL IMPRESSION:   DSM Codes:   PHP    ICD10 Codes:  F33.2   Additional problems identified via assessment: move to [facility]    Treatment Components in Which Patient Participated (check all that apply):  Education group(s), 1:1 teaching/therapy, Medication Management and Group Therapy    Summary of Course of Treatment: Active participation all education forums, process groups, activity groups, and individual counseling sessions.       Condition at Time of Transfer/Discharge: Met treatment goals.    [x] Medications Reviewed with Copy to Patient    Referred to: Refer to [facility]: Intensive Outpatient Program     Patient is in agreement with discharge plan: yes    Latoya Mills R.N.

## 2018-09-21 ENCOUNTER — HOSPITAL ENCOUNTER (OUTPATIENT)
Dept: BEHAVIORAL HEALTH | Facility: MEDICAL CENTER | Age: 73
End: 2018-09-21
Attending: PSYCHIATRY & NEUROLOGY
Payer: MEDICARE

## 2018-09-21 ENCOUNTER — APPOINTMENT (OUTPATIENT)
Dept: HEALTH INFORMATION MANAGEMENT | Facility: MEDICAL CENTER | Age: 73
End: 2018-09-21
Payer: MEDICARE

## 2018-09-21 PROCEDURE — 90853 GROUP PSYCHOTHERAPY: CPT | Performed by: MARRIAGE & FAMILY THERAPIST

## 2018-09-21 NOTE — H&P
Group Therapy Checklist  Attendance: Attended  Attendance Duration (min):  (90 min.)  Number of Participants: 11  Program/Group: Intensive Outpatient Program  Topics Covered: Weekend planning  Participation: Limited verbal participation (Pt  will do things around the house this weekend.   She maintains a very pessimistic attitude towards life. )  Affect/Mood Range: Constricted  Affect/Mood Display: Congruent w/content, Angry  Cognition: Oriented, Alert  Evidence of Imminent Suicide Risk: No  Evidence of imminent homicide risk: No  Therapeutic Interventions: Emotion clarification, Cognitive clarification  Progress Toward Treatment Goal: Moderate improvement

## 2018-09-21 NOTE — BH THERAPY
Attended 60 minutes  Topic: Belief systems  Active verbal participation, attentive  Affect/mood: normal range, flexible  Affect/mood display: congruent  Cognition: alert, oriented  Evidence of suicide risk: No  Therapeutic intervention: cognitive clarification  Moderate improvement

## 2018-09-25 ENCOUNTER — HOSPITAL ENCOUNTER (OUTPATIENT)
Dept: BEHAVIORAL HEALTH | Facility: MEDICAL CENTER | Age: 73
End: 2018-09-25
Attending: PSYCHIATRY & NEUROLOGY
Payer: MEDICARE

## 2018-09-25 DIAGNOSIS — F33.1 MDD (MAJOR DEPRESSIVE DISORDER), RECURRENT EPISODE, MODERATE (HCC): ICD-10-CM

## 2018-09-25 PROCEDURE — 90832 PSYTX W PT 30 MINUTES: CPT

## 2018-09-25 PROCEDURE — 90853 GROUP PSYCHOTHERAPY: CPT

## 2018-09-25 NOTE — BH THERAPY
Attendance: Attended   Attendance Duration (min): 46-60  Number of Participants: 11     Program/Group: Intensive Outpatient Program  Topics Covered: Values based action  Participation: Active verbal participation  Affect/Mood Range: Normal range  Affect/Mood Display: Congruent w/content  Cognition: Alert  Evidence of Imminent Suicide Risk: No  Evidence of imminent homicide risk: No  Therapeutic Interventions: Socialization  Progress Toward Treatment Goal: No change

## 2018-09-25 NOTE — CARE PLAN
"Problem: Mood Instability Interfering with Adl’S  Goal: Stable mood and functioning    Intervention: Individual Counseling Sessions   [facility]  Therapy Progress Note    Patient Name: Geraldine Scott  Patient MRN: [id number]  Today's Date: 9/25/2018     Type of session:Individual psychotherapy  Length of session: 30 minutes  Persons in attendance:Patient    Subjective/New Info: individual session. Geraldine is working on self compassion and self care. She sees the nutritionist on Wednesday.  Geraldine is reconsidering returning to the same job. She is weighing pros and cons, looking at options. She still plans to move to [facility] in November. Discussed values based action and her life at age 72. Assigned worksheet for personalized recovery planning.     Objective/Observations:   Participation: Active verbal participation, Attentive, Engaged and Open to feedback   Grooming: Casual and Neat   Cognition: Alert and Fully Oriented   Eye contact: Good   Mood: Euthymic   Affect: Flexible, Full range and Congruent with content   Thought process: Logical and Goal-directed   Speech: Rate within normal limits and Volume within normal limits   Other:     Diagnoses: F33.1 F41.1    Current risk:   SUICIDE: Not applicable   Homicide: Not applicable   Self-harm: Not applicable   Relapse: Not applicable   Other:    Safety Plan reviewed? Not Indicated   If evidence of imminent risk is present, intervention/plan:     Therapeutic Intervention(s): Clarify:  Clarify values, Review treatment plan, Self-care skills, Stressors assessed and Supportive psychotherapy    Treatment Goal(s)/Objective(s) addressed: values based action with ACT concepts, self care and compassion.      Progress toward Treatment Goals: Moderate improvement    Plan:  - Continue Intensive Outpatient Program  - \"Homework\" recommendation: values based action worksheet  - Next appointment scheduled:  9/27/2018  - Patient is in agreement with the above plan: "  YES    Latoya Mills R.N.  9/25/2018

## 2018-09-26 ENCOUNTER — NON-PROVIDER VISIT (OUTPATIENT)
Dept: HEALTH INFORMATION MANAGEMENT | Facility: MEDICAL CENTER | Age: 73
End: 2018-09-26
Payer: MEDICARE

## 2018-09-26 VITALS — WEIGHT: 204.4 LBS | BODY MASS INDEX: 37.61 KG/M2 | HEIGHT: 62 IN

## 2018-09-26 DIAGNOSIS — E10.311 TYPE 1 DIABETES MELLITUS WITH RETINOPATHY AND MACULAR EDEMA, UNSPECIFIED LATERALITY, UNSPECIFIED RETINOPATHY SEVERITY (HCC): ICD-10-CM

## 2018-09-26 PROCEDURE — G0108 DIAB MANAGE TRN  PER INDIV: HCPCS | Performed by: INTERNAL MEDICINE

## 2018-09-26 NOTE — PROGRESS NOTES
"2018    Clay Maguire P.A.-C.  72 y.o.   Time in/out: 9:00-9:45     Anthropometrics/Objective  Vitals:    18 1343   Weight: 92.7 kg (204 lb 6.4 oz)   Height: 1.575 m (5' 2\")       Body mass index is 37.39 kg/m².    Stated Goal Weight: 135 lb   Gain 15 lb in last 3 months    Estimated Caloric needs 1518 Kcal/d per 25 kcal/kg AdjBW     See comprehensive patient history form for further information     Subjective:  - Geraldine is here today for help managing her blood sugar patterns   - She was diagnosed w/ DM in    - She is currently working with the RN/CDE as well   - Of note, is she currently struggling with an episode of major depression and in partial hospitalization for this. She reports she used to be better at her DM management, but since her struggles with depression, this has become more difficult for many reasons; apathy, variable appetite, emotional eating, etc.    - Checks her BG 4 times per day and does have a CGM. Reports a CHO to insulin ratio of 1u per 6 grams CHO with correction dose.    - Has markers of diabetes distress per DM Questionnaire      Up ~ 5-6am  B - 7am, Malt-o-Meal or cheerios or oatmeal or [employer] coffeecake or a donut   No snack  L - skips a lot since she is not hungry or she will go out. Likes Italian food, caprese salad and pasta  Yesterday she had 1/2 cup coke due to a low then grazed rest of afternoon/evening  D - spread out over a few hours; yogurt, glass of milk, 6 oreos, 4 saltines with PB. Will also make herself about 2-3 cups pasta  Bed ~ 8pm  If she eats lunch she won't have dinner, but will have a snack.     Did not bring a log. Brought CGM (unable to download)  BG in AM: 350 this am, usually ~ 160  Pre-lunch/mid-afternoon: 130s-250s  Pre-dinner/evenins-150s    Nutrition Diagnosis (PES Statement)  Problem (Nutrition diagnosis)  1. Altered nutrition-related lab values    Etiology(Addresses the cause,contributing factors)  2. R/t endocrine disorder and " behavioral disorder    Signs/Symptoms (Address observations and stated info: subjective and objective data)  3. As evidenced by hyperglycemia    Client history:  Condition(s) associated with a diagnosis or treatment (specify) T1DM, obesity, acquired hypothyroidism dyslipidemia, MDD    Biochemical data, medical test and procedures  Lab Results   Component Value Date/Time    HBA1C 8.3 (H) 07/28/2018 07:24 AM   @  No results found for: POCGLUCOSE  Lab Results   Component Value Date/Time    CHOLSTRLTOT 154 05/05/2018 07:30 AM    LDL 71 05/05/2018 07:30 AM    HDL 62 05/05/2018 07:30 AM    TRIGLYCERIDE 103 05/05/2018 07:30 AM         Nutrition Intervention  Nutrition Prescription  Carb choices/grams - 30-45g CHO per meal and 0-15g per snack. Yes, she can have more CHO is she gives herself more insulin, BUT this is likely partly cause of her recent weight gain.     Meal and Snack  Recommend more structured meal and snack pattern.    Comprehensive Nutrition education Instruction or training leading to in-depth nutrition related knowledge about:  Benefits to following meal plan, Combine carb, protein and fat at each meal, Meal timing and spacing, Menu Planning, Metabolism of carb, protein, fat, Sweets and alcohol in moderation, Handouts provided regarding topics discussed and Theraputic diet for CHO Counting.     Monitoring & Evaluation Plan    Behavioral-Environmental:  Behavior: More structured meal pattern and normalized pattern of eating    Food / Nutrient Intake:  Macronutrients intake: keep to under 45g CHO per meal and 15g per snack    Physical Signs / Symptoms:  BG: fasting < 130 and PP < 180 and HbA1c profiles <7.0%    Assessment Notes: Geraldine would like help finding her way back to better glycemic control. She reports she knows a lot of what she should and shouldn't be eating but needs a refresher on CHO counting and help getting started. Her depression has resulted in decreased self-care with her diabetes and has  "impacted her diet in terms of when, what and how much she is eating. She is eating large portions of high CHO foods and \"sometimes gets her insulin right and sometimes doesn't give herself enough.\" She is also eating out more often. I recommended first and foremost trying to build a more routine for meals and snacks and not skipping meals. Then, I recommended consistent CHO intake to make her life easier in terms of calculating her insulin doses. I also recommended keeping her CHO to 45g max per meal to decrease the amount of insulin needed to help with her recent weight gain. She will work on these 3 things in the next month. I will go slow and easy when it comes to setting goals and expectations for her given her current mental health struggles/concerns.    F/U in 1 month       "

## 2018-09-27 ENCOUNTER — TELEPHONE (OUTPATIENT)
Dept: BEHAVIORAL HEALTH | Facility: MEDICAL CENTER | Age: 73
End: 2018-09-27

## 2018-09-27 ENCOUNTER — HOSPITAL ENCOUNTER (OUTPATIENT)
Dept: BEHAVIORAL HEALTH | Facility: MEDICAL CENTER | Age: 73
End: 2018-09-27
Attending: PSYCHIATRY & NEUROLOGY
Payer: MEDICARE

## 2018-09-27 DIAGNOSIS — F33.1 MAJOR DEPRESSIVE DISORDER, RECURRENT EPISODE, MODERATE (HCC): ICD-10-CM

## 2018-09-27 PROCEDURE — 90853 GROUP PSYCHOTHERAPY: CPT | Performed by: MARRIAGE & FAMILY THERAPIST

## 2018-09-27 NOTE — TELEPHONE ENCOUNTER
[facility]    Treatment Team Staffing    Patient Name: Geraldine Scott Program: IOP Date: 9/27/2018     Attendees:  Nadine Chapa RN, [facility]; MINH Rivera, [facility]; Latoya Mills RN and Usha Rae MD    Patient's Progress toward Goals Listed on the Treatment Plan: feeling less isolated, more engaged in treatment and in future planning of her life including residential and career moves.     1. Client's Participation When in Attendance Was: Active in a Positive Way    2. Counselor's Evaluation of Client's Progress: Positive Movement    3. Patient is attending group and individual sessions and is progressing well toward the treatment goals: yes      YES NO   A. Relapse During Program []  [x]    B. Requires physician review []  [x]    C. Referral to program inappropriate []  [x]    D. Non compliance with Treatment Plan []  [x]    E. Early treatment termination (lack of attendance) []  [x]     []  []      Comments: Geraldine is taking care of  Physical health needs as well: seeing nutritionist for her diabetes management, new glasses following eye exam and walking more.     Treatment Plan Review: - Continue Intensive Outpatient Program  - Next appointment scheduled:  10/2/2018  - Patient is in agreement with the above plan:  YES

## 2018-09-27 NOTE — H&P
Group Therapy Checklist  Attendance: Attended  Attendance Duration (min):  (90 min.)  Number of Participants: 6  Program/Group: Intensive Outpatient Program  Topics Covered:  (Group Therapy)  Participation: No verbal participation (Pt choose not to verbally participate the entire 90 min.)  Affect/Mood Range: Constricted  Affect/Mood Display: Congruent w/content  Cognition: Oriented, Alert  Evidence of Imminent Suicide Risk: No  Evidence of imminent homicide risk: No  Therapeutic Interventions: Emotion clarification, Cognitive clarification, Values clarification  Progress Toward Treatment Goal: No change

## 2018-10-02 ENCOUNTER — HOSPITAL ENCOUNTER (OUTPATIENT)
Dept: BEHAVIORAL HEALTH | Facility: MEDICAL CENTER | Age: 73
End: 2018-10-02
Attending: PSYCHIATRY & NEUROLOGY
Payer: MEDICARE

## 2018-10-02 DIAGNOSIS — F33.1 MDD (MAJOR DEPRESSIVE DISORDER), RECURRENT EPISODE, MODERATE (HCC): ICD-10-CM

## 2018-10-02 PROCEDURE — 90832 PSYTX W PT 30 MINUTES: CPT

## 2018-10-02 PROCEDURE — 90853 GROUP PSYCHOTHERAPY: CPT | Performed by: MARRIAGE & FAMILY THERAPIST

## 2018-10-02 PROCEDURE — 90853 GROUP PSYCHOTHERAPY: CPT

## 2018-10-02 NOTE — CARE PLAN
Problem: Mood Instability Interfering with Adl[facility]S  Goal: Stable mood and functioning    Intervention: Forgiveness   [facility]  Therapy Progress Note    Patient Name: Geraldine Scott  Patient MRN: [id number]  Today's Date: 10/2/2018     Type of session:Individual psychotherapy  Length of session: 30 minutes  Persons in attendance:Patient    Subjective/New Info: individual session. Processed her marriage and forgiveness. She looked at attitudes and values of the '60's and the attitudes and values of today's world. 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 She always felt she was a rebel and feels that adversity keeps her moving forward. Explored what that means as she begins her move to [facility].     Objective/Observations:   Participation: Active verbal participation, Attentive, Engaged and Open to feedback   Grooming: Casual and Neat   Cognition: Alert and Fully Oriented   Eye contact: Good   Mood: Depressed and Anxious   Affect: Flexible, Full range and Congruent with content   Thought process: Logical and Goal-directed   Speech: Rate within normal limits and Volume within normal limits   Other:     Diagnoses: F33.1    Current risk:   SUICIDE: Not applicable   Homicide: Not applicable   Self-harm: Not applicable   Relapse: Not applicable   Other:    Safety Plan reviewed? Not Indicated   If evidence of imminent risk is present, intervention/plan:     Therapeutic Intervention(s): Clarify:  Clarify values, Review treatment plan, Self-care skills, Stressors assessed and Supportive psychotherapy    Treatment Goal(s)/Objective(s) addressed: forgiveness, values and grief and loss     Progress toward Treatment Goals: Moderate improvement    Plan:  - Continue Intensive Outpatient Program  - Patient is in agreement with the above plan:   YES    Latoya Mills R.N.  10/2/2018

## 2018-10-02 NOTE — BH THERAPY
10-11:30  Attendance: Attended   Attendance Duration (min): 90  Number of Participants: 9     Program/Group: Intensive Outpatient Program  Topics Covered: Other (Comment): (process group)  Participation: Limited verbal participation, Attentive  Affect/Mood Range: Normal range, Flexible  Affect/Mood Display: Congruent w/content  Cognition: Alert, Oriented  Evidence of Imminent Suicide Risk: No  Evidence of imminent homicide risk: No  Therapeutic Interventions: Emotion clarification, Supportive psychotherapy  Progress Toward Treatment Goal: Mild improvement  Processed forgiveness and explored life on life's terms developing coping skills with emotional insights. Receptive to peer support.

## 2018-10-02 NOTE — H&P
Group Therapy Checklist  Attendance: Attended (60 min.)  Attendance Duration (min):  (60 min.)  Number of Participants: 9  Program/Group: Intensive Outpatient Program  Topics Covered: Forgiveness  Participation: Limited verbal participation, Attentive, Guarded/resistant  Affect/Mood Range: Constricted  Affect/Mood Display: Congruent w/content  Cognition: Oriented, Alert  Evidence of Imminent Suicide Risk: No  Evidence of imminent homicide risk: No  Therapeutic Interventions: Psychoeducation re: (Comment), Emotion clarification  Progress Toward Treatment Goal: Moderate improvement

## 2018-10-03 ENCOUNTER — HOSPITAL ENCOUNTER (OUTPATIENT)
Dept: BEHAVIORAL HEALTH | Facility: MEDICAL CENTER | Age: 73
End: 2018-10-03
Attending: PSYCHIATRY & NEUROLOGY
Payer: MEDICARE

## 2018-10-03 DIAGNOSIS — F33.1 MDD (MAJOR DEPRESSIVE DISORDER), RECURRENT EPISODE, MODERATE (HCC): ICD-10-CM

## 2018-10-03 PROCEDURE — 90853 GROUP PSYCHOTHERAPY: CPT

## 2018-10-03 NOTE — BH THERAPY
[id number]  Attendance: Attended   Attendance Duration (min): 60  Number of Participants: 7     Program/Group: Intensive Outpatient Program  Topics Covered: Caretaking, Caring in Relationships  Participation: Active verbal participation, Attentive  Affect/Mood Range: Normal range, Flexible  Affect/Mood Display: Congruent w/content  Cognition: Alert, Oriented  Evidence of Imminent Suicide Risk: No  Evidence of imminent homicide risk: No  Therapeutic Interventions: Cognitive clarification, Emotion clarification  Progress Toward Treatment Goal: Moderate improvement

## 2018-10-04 ENCOUNTER — HOSPITAL ENCOUNTER (OUTPATIENT)
Dept: BEHAVIORAL HEALTH | Facility: MEDICAL CENTER | Age: 73
End: 2018-10-04
Attending: PSYCHIATRY & NEUROLOGY
Payer: MEDICARE

## 2018-10-04 PROCEDURE — 90853 GROUP PSYCHOTHERAPY: CPT | Performed by: MARRIAGE & FAMILY THERAPIST

## 2018-10-04 NOTE — BH THERAPY
[id number]  Attendance: Attended   Attendance Duration (min): 60  Number of Participants: 8     Program/Group: Intensive Outpatient Program  Topics Covered: Stress Management  Participation: Limited verbal participation, Attentive  Affect/Mood Range: Normal range, Flexible  Affect/Mood Display: Congruent w/content  Cognition: Alert, Oriented  Evidence of Imminent Suicide Risk: No  Evidence of imminent homicide risk: No  Therapeutic Interventions: Cognitive clarification, Emotion clarification  Progress Toward Treatment Goal: Mild improvement

## 2018-10-05 ENCOUNTER — NON-PROVIDER VISIT (OUTPATIENT)
Dept: HEALTH INFORMATION MANAGEMENT | Facility: MEDICAL CENTER | Age: 73
End: 2018-10-05
Payer: MEDICARE

## 2018-10-05 DIAGNOSIS — O24.419 GESTATIONAL DIABETES MELLITUS (GDM) IN THIRD TRIMESTER, GESTATIONAL DIABETES METHOD OF CONTROL UNSPECIFIED: ICD-10-CM

## 2018-10-05 PROCEDURE — G0108 DIAB MANAGE TRN  PER INDIV: HCPCS | Performed by: INTERNAL MEDICINE

## 2018-10-05 NOTE — PROGRESS NOTES
Geraldine came in for 1:1 Type 1 Diabetes education.  She was diagnosed with Type 1 Diabetes in 1994.  She states she has been on the Dexcom sensor for the last 8 months.  She currently takes Lantus 16 units BID and Humalog 1: 6 grams carbohydrates.  She has struggled with loneliness and depression and has started medication and going to an outpatient treatment program that she feels is helping.  Her blood sugars are mostly in the 200's after meals.  I have encouraged her to write down her blood sugars and have shown her how to use pattern recognition for insulin adjustment.  She will try using a 1 unit: 5 gram carbohydrate ratio and take her Humalog 20 minutes before her meal.  She may do well switching to the FIASP faster acting insulin.  She plans to find out if Tresiba and FIASP are on her insurance formulary.  She was encouraged to use the plate method of eating and always try to eat her protein first to slow down the absorption of the carbohydrates.  She has not been exercising but plans to start back up at the [employer] near her house.  She has had problems with hypoglycemia with exercise in the past and she will adjust her insulin or have a snack as needed.  Now that she has the sensor it should be much easier to know when her blood sugars are starting to go down and to avoid hypoglycemia.  She is also trying to eat out less and make her meals healthier.  She states she plans to go buy a crock pot today.  She is checking her feet daily with no problems reported.  She is seeing an Eye specialist for retinopathy.  She has a follow up appointment with Dr. Rae in 3 weeks and plans on looking into getting on the insulin pump.  The following education was reviewed with good verbalized understanding.  Diabetes Education Content  Introduction To Diabetes  Define Type 1 diabetes: Education taught  Understand feaures and benefits of education and management: Education taught  Describe who is responsible for  "diabetes management: Education taught  Describe impact of diabetes on family/friends: Education taught  Diabetes Lifestyle Changes / Goals  State benefits of making appropriate lifestyle changes: Education taught  Identify lifestyle behaviors participant wants to change: Education taught  Identify risk factors that interfere with health and strategies to reduce : Education taught  Verbalize need for and frequency of health care follow-up: Education taught  Develop behavioral objectives and expected health outcomes: Education taught  Diabetes Exercise and Activity  Describe role of exercise in diabetes management: Education taught  State relationship of exercise to blood sugar: Education taught  State the benefits/risk(s) of exercise and precautions to follow: Education taught  Diabetes Self Blood Glucose Monitoring  Discuss rationale and importance of SBGM: Education taught  Discuss appropriate record keeping: Education taught  Discuss how to use results from blood glucose testing: Education taught  Evaluation and interpretation of blood glucose patterns: Education taught  Diabetes Disease Process  Discuss signs, symptoms, TX and prevention of hyperglycemia: Education taught  Discuss beta cell dysfunction and insulin resistance: Education taught  Discuss Insulin and its role in the body: Education taught  Discuss the role of the liver in glucose metabolism: Education taught  Discuss hormonal regulation: Education taught  Define benefits of good control and discuss what it means to be in \"good control\": Education taught  Discuss impact of exercise, food, meds, stress, and special factors on diabetes: Education taught  Discuss when to confer with HCP for possible treatment plan adjustments: Education taught  Diabetes Insulin and Medications  Discuss incretin secretagogs and their use in diabetes management: Education taught  Identify the onset, peak and duration of different insulin: Education taught  Pattern " Recognition and adjustment of insulin:  Education taught  Discuss proper injection technique and site rotation: Education taught  Discuss storage of insulin and disposal of sharps: Education taught  Hypoglycemia  List signs, symptoms and causes of hypoglycemia: Education taught  Discuss physiology of hypoglycemic reactions: Education taught  Accurately describe appropriate treatment and prevention: Education taught  Discuss when to contact HCP: Education taught  Sick Day Care  Verbalize important items to monitor when sick and when to contact HCP: Education taught  Review sick day box: Education taught  State diabetes medication adjustments on sick days: Education taught  Complications (Chronic)  Explain prevention, TX, signs/symptoms of: retinopathy, neuropathy, nephropathy, infections: Education taught  Explain prevention, TX, signs/symptoms of: CAD, cerebral-vascular disease and sexual dysfunction: Education taught  Identify when to notify HCP of complications: Education taught  State principles of skin, dental and foot care.  Discuss proper foot care, prevention of foot probelms when to notify HCP>: Education taught  Demonstrate how to examine feet and what to look for: Education taught  Psychosocial adjustment  Identify sources of stress: Education taught  Identify resources and techniques for stress reduction: Education taught  Discuss health care referral network / community resources for support: Education taught

## 2018-10-05 NOTE — LETTER
October 5, 2018        Geraldine Scott  MRN:  [id number]    Geraldine came in for 1:1 Type 1 Diabetes education.  She was diagnosed with Type 1 Diabetes in 1994.  She states she has been on the Dexcom sensor for the last 8 months.  She currently takes Lantus 16 units BID and Humalog 1: 6 grams carbohydrates.  She has struggled with loneliness and depression and has started medication and going to an outpatient treatment program that she feels is helping.  Her blood sugars are mostly in the 200's after meals.  I have encouraged her to write down her blood sugars and have shown her how to use pattern recognition for insulin adjustment.  She will try using a 1 unit: 5 gram carbohydrate ratio and take her Humalog 20 minutes before her meal.  She may do well switching to the FIASP faster acting insulin.  She plans to find out if Tresiba and FIASP are on her insurance formulary.  She was encouraged to use the plate method of eating and always try to eat her protein first to slow down the absorption of the carbohydrates.  She has not been exercising but plans to start back up at the [employer] near her house.  She has had problems with hypoglycemia with exercise in the past and she will adjust her insulin or have a snack as needed.  Now that she has the sensor it should be much easier to know when her blood sugars are starting to go down and to avoid hypoglycemia.  She is also trying to eat out less and make her meals healthier.  She states she plans to go buy a crock pot today.  She is checking her feet daily with no problems reported.  She is seeing an Eye specialist for retinopathy.  She has a follow up appointment with Dr. Rae in 3 weeks and plans on looking into getting on the insulin pump.  Please see the progress note for all education taught.      Thank you for your referrals,      Ember Griffin RN CDE

## 2018-10-09 ENCOUNTER — HOSPITAL ENCOUNTER (OUTPATIENT)
Dept: BEHAVIORAL HEALTH | Facility: MEDICAL CENTER | Age: 73
End: 2018-10-09
Attending: PSYCHIATRY & NEUROLOGY
Payer: MEDICARE

## 2018-10-09 DIAGNOSIS — F33.1 MAJOR DEPRESSIVE DISORDER, RECURRENT EPISODE, MODERATE (HCC): ICD-10-CM

## 2018-10-09 PROCEDURE — 90853 GROUP PSYCHOTHERAPY: CPT

## 2018-10-09 PROCEDURE — 90834 PSYTX W PT 45 MINUTES: CPT | Mod: XU

## 2018-10-09 NOTE — BH THERAPY
Group Therapy Checklist  Attendance: Attended  Attendance Duration (min): 90  Number of Participants: 9  Program/Group: Intensive Outpatient Program  Topics Covered:  (process group)  Participation: Limited verbal participation, Attentive, Open to feedback, Supportive to other group members  Affect/Mood Range: Normal range, Flexible  Affect/Mood Display: Congruent w/content  Cognition: Alert, Oriented  Evidence of Imminent Suicide Risk: No  Evidence of imminent homicide risk: No  Therapeutic Interventions: Emotion clarification, Supportive psychotherapy  Progress Toward Treatment Goal: Mild improvement  Explored the archetype of the pratima in relation to current fears and self esteem issues that block empowerment and success. Receptive to peer support.

## 2018-10-09 NOTE — BH THERAPY
Group Therapy Checklist  Attendance: Attended  Attendance Duration (min):  (60 min.)  Number of Participants: 9  Program/Group: Intensive Outpatient Program  Topics Covered: Journal writing  Participation: Limited verbal participation, Attentive  Affect/Mood Range: Constricted  Affect/Mood Display: Congruent w/content  Cognition: Oriented, Alert  Evidence of Imminent Suicide Risk: No  Evidence of imminent homicide risk: No  Therapeutic Interventions: Psychoeducation re: (Comment), Emotion clarification  Progress Toward Treatment Goal: Moderate improvement

## 2018-10-09 NOTE — CARE PLAN
Problem: Mood Instability Interfering with Adl[facility]S  Goal: Stable mood and functioning    Intervention: Individual Counseling Sessions   [facility]  Therapy Progress Note    Patient Name: Geraldine Scott  Patient MRN: [id number]  Today's Date: 10/9/2018     Type of session:Individual psychotherapy  Length of session: 45 minutes  Persons in attendance:Patient    Subjective/New Info: individual session. Processed personalized recovery plan. Geraldine is undecided about where to live and work. She has many possibilities that she has explored during program and today, she remains uncertain. Explored allowing uncertainty for a few months as one of her fears is to have more regrets for the decisions she makes. She is back to work, working from home, splitting shift while in program. Future appts with Dr Rae for med management and Dr Ulloa for OP therapy.     Objective/Observations:   Participation: Active verbal participation, Attentive, Engaged and Open to feedback   Grooming: Casual and Neat   Cognition: Alert and Fully Oriented   Eye contact: Good   Mood: Anxious   Affect: Congruent with content   Thought process: Logical and Goal-directed   Speech: Rate within normal limits and Volume within normal limits   Other:     Diagnoses: F33.1  Current risk:   SUICIDE: Not applicable   Homicide: Not applicable   Self-harm: Not applicable   Relapse: Not applicable   Other:    Safety Plan reviewed? Not Indicated   If evidence of imminent risk is present, intervention/plan:     Therapeutic Intervention(s): Behavior:  Behavioral plan developed/modified, Develop/modify treatment plan, Goal-setting, Self-care skills, Stressors assessed and Supportive psychotherapy    Treatment Goal(s)/Objective(s) addressed: personalized recovery plan.      Progress toward Treatment Goals: Mild improvement    Plan:  - Next appointment scheduled:  10/10/2018  - Transition toward termination  - Patient is in agreement with the above plan:   YES    Latoya Mills R.N.  10/9/2018

## 2018-10-10 ENCOUNTER — HOSPITAL ENCOUNTER (OUTPATIENT)
Dept: BEHAVIORAL HEALTH | Facility: MEDICAL CENTER | Age: 73
End: 2018-10-10
Attending: PSYCHIATRY & NEUROLOGY
Payer: MEDICARE

## 2018-10-10 DIAGNOSIS — F33.1 MAJOR DEPRESSIVE DISORDER, RECURRENT EPISODE, MODERATE (HCC): ICD-10-CM

## 2018-10-10 PROCEDURE — 90853 GROUP PSYCHOTHERAPY: CPT

## 2018-10-10 NOTE — BH THERAPY
"Group Therapy Checklist  Attendance: Attended  Attendance Duration (min): 60  Number of Participants: 10  Program/Group: Intensive Outpatient Program  Topics Covered: \"Pieces of Silence\"  Participation: Attentive  Affect/Mood Range: Normal range, Flexible  Affect/Mood Display: Congruent w/content  Cognition: Alert, Oriented  Evidence of Imminent Suicide Risk: No  Evidence of imminent homicide risk: No  Therapeutic Interventions: Cognitive clarification, Relapse prevention  Progress Toward Treatment Goal: Mild improvement    "

## 2018-10-11 ENCOUNTER — TELEPHONE (OUTPATIENT)
Dept: BEHAVIORAL HEALTH | Facility: MEDICAL CENTER | Age: 73
End: 2018-10-11

## 2018-10-11 ENCOUNTER — HOSPITAL ENCOUNTER (OUTPATIENT)
Dept: BEHAVIORAL HEALTH | Facility: MEDICAL CENTER | Age: 73
End: 2018-10-11
Attending: PSYCHIATRY & NEUROLOGY
Payer: MEDICARE

## 2018-10-11 DIAGNOSIS — F33.1 MAJOR DEPRESSIVE DISORDER, RECURRENT EPISODE, MODERATE (HCC): ICD-10-CM

## 2018-10-11 PROCEDURE — 90853 GROUP PSYCHOTHERAPY: CPT | Performed by: MARRIAGE & FAMILY THERAPIST

## 2018-10-11 NOTE — BH THERAPY
Group Therapy Checklist  Attendance: Attended  Attendance Duration (min):  (90 min.)  Number of Participants: 11  Program/Group: Intensive Outpatient Program  Topics Covered:  (Group Therapy)  Participation: Limited verbal participation, Attentive (Pt chose the feeling of 'happy' as the most difficult and stated she never experiences this emotion.  She completes the IOP program today. )  Affect/Mood Range: Constricted  Affect/Mood Display: Sad  Cognition: Oriented, Alert  Evidence of Imminent Suicide Risk: No  Evidence of imminent homicide risk: No  Therapeutic Interventions: Emotion clarification, Cognitive clarification  Progress Toward Treatment Goal: Moderate improvement

## 2018-10-11 NOTE — TELEPHONE ENCOUNTER
[facility]  TRANSFER/DISCHARGE SUMMARY FORM    HHPI / SCP: no Other Ins.: Medicare     Patient Name: Geraldine Scott  Admission Date: 18  Level of Care Attended:  Intens.OP : 1945  Transfer/Discharge Date: MRN: [id number]  10/11/18       SIGNIFICANT FINDINGS/CLINICAL IMPRESSION:   DSM Codes:   IOP    ICD10 Codes: F33.2    Additional problems identified via assessment: debating where to live and work    Treatment Components in Which Patient Participated (check all that apply):  Education group(s), 1:1 teaching/therapy, Medication Management and Group Therapy    Summary of Course of Treatment: Active participation all education forums, process groups and individual counseling sessions.     Condition at Time of Transfer/Discharge: Met treatment goals.    [x] Medications Reviewed with Copy to Patient    Referred to: Refer to [facility]: Outpatient Therapy and Outpatient Medication Management     Patient is in agreement with discharge plan: yes    Latoya Mills R.N.

## 2018-10-16 ENCOUNTER — OFFICE VISIT (OUTPATIENT)
Dept: BEHAVIORAL HEALTH | Facility: CLINIC | Age: 73
End: 2018-10-16
Payer: MEDICARE

## 2018-10-16 DIAGNOSIS — F33.1 MAJOR DEPRESSIVE DISORDER, RECURRENT EPISODE, MODERATE (HCC): ICD-10-CM

## 2018-10-16 PROCEDURE — 90834 PSYTX W PT 45 MINUTES: CPT | Performed by: PSYCHOLOGIST

## 2018-10-16 NOTE — BH THERAPY
[facility]  Therapy Progress Note    Patient Name: Geraldine Scott  Patient MRN: [id number]  Today's Date: 10/16/2018     Type of session:Individual psychotherapy  Length of session: 45 minutes  Persons in attendance:Patient     Summary and Reason for Treatment:    Patient is a 72 y.o. old female with history of diabetes mellitus type 1, hypertension, dyslipidemia, hypothyroidism referred by the [facility] PCP and [facility] for the treatment of depression. She reports struggling with depression on and off since her late 30s/early 40s. She has been off antidepressants for over 10 years but has noticed on and off worsening depression since she moved from [place] to [place] in 2014. She moved to be closer to her father and 2 sisters and moved to [place] in December 2017 to be in a financially better position. She has been regretting her moved to [place] and would now like to move to [place] to be closer again to family. She has lately noticed worsening depressive symptoms. She endorses feeling depressed, irritable and angry. She has noticed a decline in her motivation and energy levels. She has been sleeping and eating a lot and has gained a lot of weight which is not good for her diabetes. She has been taking her insulin as prescribed but is not eating healthy like before. She has a fitness center close to her home but lacks the motivation to go and exercise. She denies any current psychosocial or relationship stressors. She works part-time from home and has lately noticed that she has been making some small mistakes at home. She does not have any family or friends in town and has been struggling to make any new connections here. She does like taking care of her cats and takes them once a week to a local pet store. She denies any symptoms consistent with hypomania, benjamin or psychosis. She denies struggling with anxiety on a daily basis but does get stressed when she is in big crowds and avoids  them as much as she can. She has also noticed more struggles in decision making lately. She does not feel confident in the decisions that she makes for herself. She endorses passive thoughts of death on a regular basis but denies any recent suicide attempt or having current active thoughts, intent or plan of wanting to hurt herself or others. She states that her Mandaeism jo-ann is her biggest protective factor. She has a strong belief and the devil and but not intentionally hurt herself because she is afraid of health.       Subjective/New Info: The patient's estimated global assessment of functioning appeared reasonably good with only short lived and expectable reactions to everyday stressful events. The patient shows only slight difficulty in relational and/or work/school functioning, but has been increasingly isolated. The patient's affective and emotional state appeared generally calm and reflective, sad, dysphoric, rather distressed, moderately depressed, withdrawn, and tired. In general her affect was congruent with her apparent mood.    Objective/Observations:   Participation: Active verbal participation   Grooming: Casual and Neat   Cognition: Alert and Fully Oriented   Eye contact: Good   Mood: Depressed   Affect: Full range   Thought process: Logical and Goal-directed   Speech: Rate within normal limits        Diagnoses:   1. Major depressive disorder, recurrent episode, moderate (HCC)         Current risk:   SUICIDE: Moderate   Homicide: Low   Self-harm: Moderate   Relapse: Low   Other:    Safety Plan reviewed? Yes        Psychometric Test Results:     BHM-20  Global Mental Health Severe Distress  Well Being Scale  Severe Distress  Symptoms Scale   Mild Distress  Anxiety Subscale  Moderate Distress  Depression Subscale  Severe Distress  Alcohol/Drug Subscale Within Normal Limits  Bipolar Subscale  Within Normal Limits  Eating Disorder Subscale Moderate Distress  Harm to other Subscale Within Normal  Limits  Suicide Monitoring Scale High Risk.                          Life Functioning Scale             Severe Distress    If evidence of imminent risk is present, intervention/plan:  Crisis Response Plan:  Reviewed emergency resources with the patient and the patient expressed understanding including:  If feeling suicidal, patient will call or present to the [facility] during duty hours or present to closest ED ([facility] or [facility], call 911 or crisis hotline (4-786-351-MNPI) after duty hours.      Therapeutic Intervention(s): Behavior:  Behavioral activation, Behavior analysis, Behavioral plan developed/modified and Behavior modification, Interpersonal effectiveness skills, Leisure and recreation skills, Positive behavior reinforced, Review treatment plan and Supportive psychotherapy.    The main therapeutic interventions consisted of: cognitive-behavioral techniques; an emphasis on evaluation and exploration of problematic automatic thoughts; setting goals for the treatment and the session; targeting more effective problem solving and coping skills in daily life; an emphasis on addressing problematic core beliefs; evaluating and addressing dysfunctional thoughts; and reviewing and exploring homework assignments.       Treatment Goal(s)/Objective(s) addressed:  Goal: Develop strategies to reduce symptoms, or   Reduce problematic behavior and improve coping skills     Acquire and demonstrate coping skills to deal with depression.  Learn strategies to deal with dysphoric moods.  Substitute dichotomous thinking with ability to tolerate ambiguity and complexity in people and issues.  Acquire and show adequate social and interpersonal skills.  Learn and role play new interpersonal relationship skills.  Lower the frequency of self-damaging thoughts (such as, suicidal behaviors, reckless driving, sexual acting out, binge eating, or substance abuse  ").      Progress toward Treatment Goals: Mild improvement    Plan:  1) The patient will return to the clinic 1 week.  2) Crisis Response Plan:  Reviewed emergency resources with the patient and the patient expressed understanding including:  If feeling suicidal, patient will call or present to the [facility] during duty hours or present to closest ED ([facility] or [facility], call 911 or crisis hotline ([phone]-TVTC) after duty hours.  3) Referrals/Consults:  N/A  4) Barriers to Learning:  No  5) Readiness to Learn:  Yes  6) Cultural Concerns:  No  7) Patient voiced understanding of, and agreement with, plan and goals as annotated above.  8) Declare these services are medically necessary and appropriate to the patient’s diagnosis and needs  9) The point of contact at the [facility] regarding this evaluation is Dr. Ulloa, Psychologist.     - Continue Individual therapy  - \"Homework\" recommendation: Investigate some leisure activities that will provide the patient with social and interpersonal support. Also suggested that patient increase her contact with her sisters.  - Next appointment scheduled:  10/30/2018    Mateusz Ulloa, Ph.D.  10/16/2018                                     "

## 2018-10-30 ENCOUNTER — APPOINTMENT (OUTPATIENT)
Dept: BEHAVIORAL HEALTH | Facility: CLINIC | Age: 73
End: 2018-10-30
Payer: MEDICARE

## 2018-10-31 ENCOUNTER — OFFICE VISIT (OUTPATIENT)
Dept: ENDOCRINOLOGY | Facility: MEDICAL CENTER | Age: 73
End: 2018-10-31
Payer: MEDICARE

## 2018-10-31 ENCOUNTER — NON-PROVIDER VISIT (OUTPATIENT)
Dept: HEALTH INFORMATION MANAGEMENT | Facility: MEDICAL CENTER | Age: 73
End: 2018-10-31
Payer: MEDICARE

## 2018-10-31 VITALS
HEART RATE: 82 BPM | SYSTOLIC BLOOD PRESSURE: 120 MMHG | HEIGHT: 63 IN | DIASTOLIC BLOOD PRESSURE: 64 MMHG | OXYGEN SATURATION: 91 % | WEIGHT: 205 LBS | RESPIRATION RATE: 15 BRPM | BODY MASS INDEX: 36.32 KG/M2

## 2018-10-31 DIAGNOSIS — E10.65 TYPE 1 DIABETES MELLITUS WITH HYPERGLYCEMIA (HCC): ICD-10-CM

## 2018-10-31 DIAGNOSIS — E10.311 TYPE 1 DIABETES MELLITUS WITH RETINOPATHY AND MACULAR EDEMA, UNSPECIFIED LATERALITY, UNSPECIFIED RETINOPATHY SEVERITY (HCC): ICD-10-CM

## 2018-10-31 DIAGNOSIS — E78.2 MIXED HYPERLIPIDEMIA: ICD-10-CM

## 2018-10-31 DIAGNOSIS — E78.5 DYSLIPIDEMIA: ICD-10-CM

## 2018-10-31 DIAGNOSIS — I10 ESSENTIAL HYPERTENSION: ICD-10-CM

## 2018-10-31 DIAGNOSIS — E03.9 ACQUIRED HYPOTHYROIDISM: ICD-10-CM

## 2018-10-31 LAB
HBA1C MFR BLD: 7.5 % (ref ?–5.8)
INT CON NEG: NORMAL
INT CON POS: NORMAL

## 2018-10-31 PROCEDURE — G0108 DIAB MANAGE TRN  PER INDIV: HCPCS | Performed by: INTERNAL MEDICINE

## 2018-10-31 PROCEDURE — 95251 CONT GLUC MNTR ANALYSIS I&R: CPT | Performed by: INTERNAL MEDICINE

## 2018-10-31 PROCEDURE — 99214 OFFICE O/P EST MOD 30 MIN: CPT | Mod: 25 | Performed by: INTERNAL MEDICINE

## 2018-10-31 PROCEDURE — 83036 HEMOGLOBIN GLYCOSYLATED A1C: CPT | Performed by: INTERNAL MEDICINE

## 2018-10-31 RX ORDER — ATORVASTATIN CALCIUM 20 MG/1
20 TABLET, FILM COATED ORAL
Qty: 90 TAB | Refills: 1 | Status: SHIPPED | OUTPATIENT
Start: 2018-10-31 | End: 2019-04-29 | Stop reason: SDUPTHER

## 2018-10-31 NOTE — PROGRESS NOTES
Nutrition Reassess    10/31/2018   Clay Maguire P.A.-C. 72 y.o.   Time: in/out 8:57-9:29      Subjective:   - Geraldine has since our last visit seen Ember Griffin RN/CDE. She adjusted her CHO:insulin ratio to 1:5 which Geraldine reports following. Ember also changed her Humalog to 20 minutes before meals.    - She reports still struggling not necessarily due to lack of knowledge but actually doing it. Doesn't like to cook for just herself. Doesn't have a microwave and can't buy one as it's too heave to bring upstairs.    - She is planning on moving to [place] soon, around 2-3 months from now    - Doesn't have a f/u w/ Ember, but has an endo appointment coming up   - She gets anxious grocery shopping. Is not meal planning.    - Doesn't like chicken. Choice of proteins are hamburger/ground beef and italian sausage or eggs. Favorite thing to make is pasta.   - She is hard to keep on topic today    Anthropometrics/Objective  BG Values: AM 80 - 293, mostly 200s  Before lunch:   Before dinner:   Before bed: 169-355    ReAssesment/Notes:  Geraldine hasn't made much progress since our last meeting, however, reports it's not due to not knowing, but motivation to do so. Through our conversation, her main barrier is lack of preparedness. She knows she should limit CHO to ~1/4 plate or size of her fist and has the tools to CHO count and then she should add protein and veggies, but she doesn't like to/want to cook. I worked with her today on meal planning. We developed together almost a full weeks meal plan and I gave her blank copies to use at home. I showed her she really only needs to cook 2 x week and use left overs for other meals. I also showed her how to use the proteins she likes and foods she likes and turn them into balances meals (pasta, 1 cup, WITH 1/4th plate meatballs and 1/2 plate NSV, like green beans). I showed her how to take the meal plan and make a grocery list so she isn't so overwhelmed at the store.  I feel  her medications would benefit from further adjustments.     Follow-up: Explained obtain a new referral in 2019 for reinstatement of 2 hours

## 2018-10-31 NOTE — PROGRESS NOTES
"Endocrinology Clinic Progress Note    CC: Type 1 diabetes    HPI:  1. Type 1 diabetes mellitus with hyperglycemia (HCC)  She is currently on Lantus 16 units twice a day and Humalog 1 unit for 5 g of carbs.  She checks blood sugars 4-6 times a day and she has continuous glucose monitoring system as well.  CGM tracings reviewed.  Fasting blood sugars are mostly close to 160s.  No fasting hypoglycemia.  Blood sugars after lunch are well controlled.  Blood sugars after breakfast and blood sugars after dinner are higher than goal. She denies numbness or tingling in feet.    2. Acquired hypothyroidism  She is currently on levothyroxine 112 mcg daily.  She reports compliance with medications.    3. Essential hypertension  Blood pressure is well controlled.  She is on ACE inhibitor.    4. Mixed hyperlipidemia  She is currently on Lipitor, tolerating well.    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)   • Obesity (BMI 30-39.9)   • Acquired hypothyroidism   • Patient has active physician orders for life-sustaining treatment (POLST) form   • Dyslipidemia   • Impacted cerumen of left ear   • Major depressive disorder, recurrent episode, moderate (HCC)     EXAM:  Vital signs: /64 (BP Location: Left arm, Patient Position: Sitting, BP Cuff Size: Adult)   Pulse 82   Resp 15   Ht 1.588 m (5' 2.5\")   Wt 93 kg (205 lb)   SpO2 91%   BMI 36.90 kg/m²   General: No apparent distress, cooperative  Eyes: No scleral icterus, no discharge  Neck: Normal on external inspection  Resp: Normal effort  Extremities: No lower extremity edema  Skin: No rash on visible skin  Psych: Alert and oriented, normal mood and affect    Assessment and Plan:    1. Type 1 diabetes mellitus with hyperglycemia (HCC)  · Hemoglobin A1c today in the clinic is 7.5%, improved from 8.3% " at last clinic visit  · Goal A1c less than 7.5%  · C increased Lantus to 17 units twice a day  · Increased Humalog to 1 unit for 4 g of carbs for breakfast and dinner and continue 1 unit for 5 g of carbs for lunch    2. Acquired hypothyroidism  · Continue levothyroxine 112 mcg  · Repeat labs for TSH and free T4    3. Essential hypertension  · Blood pressure is well controlled    4. Mixed hyperlipidemia  · Continue Lipitor    Return in about 3 months (around 1/31/2019).    Thank you for allowing me to participate in the care of this patient.    Destini Rae M.D.    CC:   [email or web address].    This note was created using voice recognition software (Dragon). The accuracy of the dictation is limited by the abilities of the software. I have reviewed the note prior to signing, however some errors in grammar and context are still possible. If you have any questions related to this note please do not hesitate to contact our office.

## 2018-11-01 ENCOUNTER — OFFICE VISIT (OUTPATIENT)
Dept: BEHAVIORAL HEALTH | Facility: CLINIC | Age: 73
End: 2018-11-01
Payer: MEDICARE

## 2018-11-01 DIAGNOSIS — F33.1 MAJOR DEPRESSIVE DISORDER, RECURRENT EPISODE, MODERATE (HCC): ICD-10-CM

## 2018-11-01 PROCEDURE — 90834 PSYTX W PT 45 MINUTES: CPT | Performed by: PSYCHOLOGIST

## 2018-11-01 NOTE — BH THERAPY
[facility]  Therapy Progress Note    Patient Name: Geraldine Scott  Patient MRN: [id number]  Today's Date: 11/1/2018     Type of session:Individual psychotherapy  Length of session: 45 minutes  Persons in attendance:Patient    Subjective/New Info:   Patient is a 72 y.o. old female with history of diabetes mellitus type 1, hypertension, dyslipidemia, hypothyroidism referred by the [facility] PCP and [facility] for the treatment of depression. She reports struggling with depression on and off since her late 30s/early 40s. She has been off antidepressants for over 10 years but has noticed on and off worsening depression since she moved from [place] to [place] in 2014. She moved to be closer to her father and 2 sisters and moved to [place] in December 2017 to be in a financially better position. She has been regretting her moved to [place] and would now like to move to [place] to be closer again to family. She has lately noticed worsening depressive symptoms. She denies any current psychosocial or relationship stressors. She works part-time from home and has lately noticed that she has been making some small mistakes at home. She endorses passive thoughts of death on a regular basis but denies any recent suicide attempt or having current active thoughts, intent or plan of wanting to hurt herself or others. She states that her Hinduism jo-ann is her biggest protective factor. She has a strong belief and the devil and but not intentionally hurt herself because she is afraid of health. Patient appears quite a bit more optimistic and postitive this week. She has been focused on writing a book and getting ready to move back to [place]. She has also been increasing her activity level and attending some social activities.     Patient did not present in acute distress; however, remains quite depressed. Patient was appropriately groomed and cooperative. Patient was alert and oriented to person. Place, and  time. Eye contact was appropriate. No abnormalities in attention or concentration were noted. No abnormalities of movement present; psychomotor activity was normal. Speech was fluent and regular in rhythm, rate, volume, and tone. Thought processes were linear, logical, and goal-directed. There was no evidence of thought disorder. No auditory or visual hallucinations. Long and short term memory appeared to be intact. Insight, judgment, and impulse control were deemed to be within normal limits. Reported mood was fairly positive. Affect was appropriate and congruent with thought content and conversation. Patient denied current suicidal and homicidal ideation in plan, intent, and preparatory behavior       Objective/Observations:   Participation: Active verbal participation, Attentive, Engaged and Open to feedback   Grooming: Casual and Neat   Cognition: Alert and Fully Oriented   Eye contact: Good   Mood: Depressed   Affect: Flexible   Thought process: Logical   Speech: Rate within normal limits       Diagnoses:   1. Major depressive disorder, recurrent episode, moderate (HCC)         Current risk:   SUICIDE: Low   Homicide: Low   Self-harm: Low   Relapse: Not applicable   Other:    Safety Plan reviewed? Not Indicated   If evidence of imminent risk is present, intervention/plan:     Therapeutic Intervention(s): Behavior:  Behavioral plan developed/modified Behavior modification, Interpersonal effectiveness skills, Leisure and recreation skills, Positive behavior reinforced    Treatment Goal(s)/Objective(s) addressed:    Goal: Decrease Depression  ObjectiveA: Patient will increase activity level by will participating in at least two hours, three times per week in a social or leisure activity with family member or close friend.  Objective B: Patient will express an increase in positive statements about self by will making at least five positive statements per day about self or her circumstances.  Objective C: Patient  will spend more time with peers/collegues in social situations by spending at least thirty minutes four times per week in a social situation, interacting appropriately with a friend.       Progress toward Treatment Goals: Moderate improvement    Plan:  - Continue Individual therapy    Mateusz Ulloa, Ph.D.  11/1/2018

## 2018-11-21 ENCOUNTER — APPOINTMENT (OUTPATIENT)
Dept: BEHAVIORAL HEALTH | Facility: CLINIC | Age: 73
End: 2018-11-21
Payer: MEDICARE

## 2018-11-26 ENCOUNTER — OFFICE VISIT (OUTPATIENT)
Dept: BEHAVIORAL HEALTH | Facility: CLINIC | Age: 73
End: 2018-11-26
Payer: MEDICARE

## 2018-11-26 DIAGNOSIS — F33.1 MAJOR DEPRESSIVE DISORDER, RECURRENT EPISODE, MODERATE (HCC): ICD-10-CM

## 2018-11-26 PROCEDURE — 90834 PSYTX W PT 45 MINUTES: CPT | Performed by: PSYCHOLOGIST

## 2018-11-26 NOTE — BH THERAPY
[facility]  Therapy Progress Note    Patient Name: Geraldine Scott  Patient MRN: [id number]  Today's Date: 11/26/2018     Type of session:Individual psychotherapy  Length of session: 45 minutes  Persons in attendance:Patient    Subjective/New Info:   Patient is a 72 year old female with history of diabetes mellitus type 1, hypertension, dyslipidemia, and hypothyroidism. She reports struggling with depression on and off since her late 30s/early 40s. She has been off antidepressants for over 10 years but has noticed on and off worsening depression since she moved from [place] to [place] in 2014. She moved to [place] to be closer to her father and two sisters in December 2017 and to be in a financially better position. She has been regretting her moved to [place] and would now like to move to [place] to be closer again to family. She has lately noticed worsening depressive symptoms. She denies any current psychosocial or relationship stressors. She works part-time from home and has lately noticed that she has been making some small mistakes at home. She endorses passive thoughts of death on a regular basis but denies any recent suicide attempt or having current active thoughts, intent or plan of wanting to hurt herself or others. She states that her [employer] jo-ann is her biggest protective factor. Patient appears more optimistic about moving to [place] or [place], but she is very stressed with her job.She has been spending a lot of time reading in her spare time; however, she has been researching some small group activities. She has been increasing her activity level and attending some social activities.     Patient did not present in acute distress; however, remains quite depressed. Patient was appropriately groomed and cooperative. Patient was alert and oriented to person. Place, and time. Eye contact was appropriate. No abnormalities in attention or concentration were noted. No abnormalities of  movement present; psychomotor activity was normal. Speech was fluent and regular in rhythm, rate, volume, and tone. Thought processes were linear, logical, and goal-directed. There was no evidence of thought disorder. No auditory or visual hallucinations. Long and short term memory appeared to be intact. Insight, judgment, and impulse control were deemed to be within normal limits. Reported mood was fairly positive. Affect was appropriate and congruent with thought content and conversation. Patient denied current suicidal and homicidal ideation in plan, intent, and preparatory behavior        Objective/Observations:              Participation: Active verbal participation, Attentive, Engaged and Open to feedback              Grooming: Casual and Neat              Cognition: Alert and Fully Oriented              Eye contact: Good              Mood: Depressed              Affect: Flexible              Thought process: Logical              Speech: Rate within normal limits                  Diagnoses:   1. Major depressive disorder, recurrent episode, moderate (HCC)       Psychometric Test Results:     BHM-20  Global Mental Health Moderate Distress  Well Being Scale  Moderate Distress  Symptoms Scale   Mild Distress  Anxiety Subscale  Mild Distress  Depression Subscale  Severe Distress  Alcohol/Drug Subscale Within Normal Limits  Bipolar Subscale  Within Normal Limits  Eating Disorder Subscale Within Normal Limits  Harm to other Subscale Within Normal Limits  Suicide Monitoring Scale No Indication of Rsik  Life Functioning Scale Severe Distress      Current risk:   SUICIDE: Low   Homicide: Low   Self-harm: Low   Relapse: Not applicable   Other:    Safety Plan reviewed? No   If evidence of imminent risk is present, intervention/plan:     Therapeutic Intervention(s): Cognitive modification, Distress tolerance skills, Socialization and Supportive psychotherapy    Treatment Goal(s)/Objective(s) addressed:     Goal: Decrease  Depression  Objective A: Patient will increase activity level by will participating in at least two hours, three times per week in a social or leisure activity with family member or close friend.  Objective B: Patient will express an increase in positive statements about self by will making at least five positive statements per day about self or her circumstances.  Objective C: Patient will spend more time with peers/colleagues in social situations by spending at least thirty minutes four times per week in a social situation, interacting appropriately with a friend.    Progress toward Treatment Goals: Mild improvement    Plan:  - Continue Individual therapy    Mateusz Ulloa, Ph.D.  11/26/2018

## 2018-12-06 ENCOUNTER — OFFICE VISIT (OUTPATIENT)
Dept: BEHAVIORAL HEALTH | Facility: CLINIC | Age: 73
End: 2018-12-06
Payer: MEDICARE

## 2018-12-06 VITALS
HEART RATE: 98 BPM | HEIGHT: 63 IN | SYSTOLIC BLOOD PRESSURE: 146 MMHG | DIASTOLIC BLOOD PRESSURE: 83 MMHG | BODY MASS INDEX: 36.68 KG/M2 | WEIGHT: 207 LBS

## 2018-12-06 DIAGNOSIS — F33.1 MAJOR DEPRESSIVE DISORDER, RECURRENT EPISODE, MODERATE (HCC): ICD-10-CM

## 2018-12-06 PROCEDURE — 99213 OFFICE O/P EST LOW 20 MIN: CPT | Performed by: PSYCHIATRY & NEUROLOGY

## 2018-12-06 RX ORDER — BUPROPION HCL 300 MG
300 TABLET, EXTENDED RELEASE 24 HR ORAL EVERY MORNING
Qty: 90 TAB | Refills: 0 | Status: SHIPPED | OUTPATIENT
Start: 2018-12-06 | End: 2019-04-25

## 2018-12-06 NOTE — PROGRESS NOTES
PSYCHIATRY FOLLOW-UP NOTE      Chief Complaint   Patient presents with   • Follow-Up     depression         History Of Present Illness:  Geraldine Scott is a 73 y.o. old female with hypothyroidism, major depressive disorder, hypertension, dyslipidemia, diabetes mellitus type 1 comes in today for follow up, was last seen for 2 months ago.  She reports struggling on and off with depression.  She has been compliant with Wellbutrin and feels that it has helped her a little bit but there are days when she is having crying spells and is feeling irritable.  She feels that her work is a significant source of stress for her and she is thinking about quitting her job once she is able to get incentives by the end of 2019.  She is also hopeful about her move to [place] to a smaller [facility] community and she will be closer to her family.  She has been able to go out to some social gatherings in her apartment complex but has not been able to improve her physical activity.  She is still having some days where she eats meals that are not good for her diabetes.  Sleep has been good.  She has been enjoying reading.  She denies any recent reckless or impulsive behaviors.  She denies having thoughts of wanting to hurt herself or others.    Social History:   She is currently single and lives alone in [place].  She has been  and  ×1.  She had a daughter who ran away when she was 17 years old and she has not had any contact with her since then.  She was in a long-term relationship for over 15 years which ended in early 80s.  She currently works part-time as a remote .  Her mother is  and father lives in [place].  She is to oldest of 4 siblings.  Her younger siblings live in [place] and [place].    Substance Use:  Alcohol - Drinks 0-1 beers a month  Nicotine - Denies  Illicit drugs - Denies    Past Medication Trials:  Wellbutrin, Paxil.  She has also tried several tricyclic antidepressants in  "the 80s but endorses side effects from them    Medications:  Current Outpatient Prescriptions   Medication Sig Dispense Refill   • WELLBUTRIN  MG XL tablet Take 1 Tab by mouth every morning. 90 Tab 0   • atorvastatin (LIPITOR) 20 MG Tab Take 1 Tab by mouth every bedtime. 90 Tab 1   • levothyroxine (SYNTHROID) 112 MCG Tab TAKE ONE TABLET BY MOUTH IN THE MORNING on an empty stomach 90 Tab 2   • HUMALOG KWIKPEN 100 UNIT/ML Solution Pen-injector injection      • BD PEN NEEDLE DELMI U/F 32G X 4 MM Misc      • Blood Glucose Monitoring Suppl Supplies Misc Sonar.me contour glucometer test strips for BG check 4-5 times a day 150 Each 6   • glucose blood strip 1 Each by Other route as needed.     • insulin glargine (LANTUS) 100 UNIT/ML Solution Inject  as instructed every evening.     • cholestyramine light (PREVALITE) 4 GM/DOSE powder Take  by mouth.     • vitamin D, Ergocalciferol, (DRISDOL) 92051 units Cap capsule Take  by mouth every 7 days.     • lisinopril (PRINIVIL) 20 MG Tab Take 1 Tab by mouth every day. 30 Tab 0     No current facility-administered medications for this visit.        Review Of Systems:    Constitutional - Positive for fatigue  Respiratory - Negative for shortness of breath, cough  CVS - Negative for chest pain, palpitations  GI - Negative for nausea, vomiting, abdominal pain, diarrhea, constipation  Musculoskeletal - Negative for back pain  Neurological - Negative for headaches  Psychiatric - Positive for depression, irritability    Physical Examination:  Vital signs: /83   Pulse 98   Ht 1.588 m (5' 2.5\")   Wt 93.9 kg (207 lb)   BMI 37.26 kg/m²     Musculoskeletal: Normal gait. No abnormal movements.     Mental Status Evaluation:   General: Elderly white female, dressed in casual attire, good grooming and hygiene, in no apparent distress, calm and cooperative, good eye contact, no psychomotor agitation or retardation  Orientation: Alert and oriented to person, place and time  Recent and " "remote memory: Grossly intact  Attention span and concentration: Grossly intact  Speech: Spontaneous, normal rate, rhythm and tone  Thought Process: Linear, logical and goal directed  Thought Content: Denies suicidal or homicidal ideations, intent or plan  Perception: Denies auditory or visual hallucinations. No delusions noted  Associations: Intact  Language: Appropriate  Fund of knowledge and vocabulary: Grossly adequate  Mood: \"am okay\"  Affect: Dysphoric at times, mood congruent  Insight: Good  Judgment: Good    Depression screening:  Depression Screen (PHQ-2/PHQ-9) 2/6/2018 9/4/2018   PHQ-2 Total Score 1 5   PHQ-9 Total Score 7 19     Interpretation of PHQ-9 Total Score   Score Severity   1-4 No Depression   5-9 Mild Depression   10-14 Moderate Depression   15-19 Moderately Severe Depression   20-27 Severe Depression    Medical Records/Labs/Diagnostic Tests Reviewed:  [facility] records - no prescribed controlled medications found in the last 1 year      Impression:  1. Major depressive disorder, recurrent, moderate - improving, not at goal    Plan:  1. Increase Wellbutrin XL (brand name) to 300 mg in the morning for depression  2. Continue individual psychotherapy with Dr. Mateusz Ulloa, Ph.D    Return to clinic in 6-8 weeks or sooner if symptoms worsen    The proposed treatment plan was discussed with the patient who was provided the opportunity to ask questions and make suggestions regarding alternative treatment. Patient verbalized understanding and expressed agreement with the plan.     Usha Rae M.D.  12/06/18    This note was created using voice recognition software (Dragon). The accuracy of the dictation is limited by the abilities of the software. I have reviewed the note prior to signing, however some errors in grammar and context are still possible. If you have any questions related to this note please do not hesitate to contact our office.     "

## 2018-12-17 ENCOUNTER — OFFICE VISIT (OUTPATIENT)
Dept: BEHAVIORAL HEALTH | Facility: CLINIC | Age: 73
End: 2018-12-17
Payer: MEDICARE

## 2018-12-17 DIAGNOSIS — F33.1 MAJOR DEPRESSIVE DISORDER, RECURRENT EPISODE, MODERATE (HCC): ICD-10-CM

## 2018-12-17 PROCEDURE — 90834 PSYTX W PT 45 MINUTES: CPT | Performed by: PSYCHOLOGIST

## 2018-12-17 NOTE — BH THERAPY
[facility]  Therapy Progress Note    Patient Name: Geraldine Scott  Patient MRN: [id number]  Today's Date: 12/17/2018     Type of session:Individual psychotherapy  Length of session: 45 minutes  Persons in attendance:Patient    Subjective/New Info:   Patient is a 72 year old female with history of diabetes mellitus type 1, hypertension, dyslipidemia, and hypothyroidism. She reports struggling with depression on and off since her late 30s/early 40s. She has been off antidepressants for over 10 years but has noticed on and off worsening depression since she moved from [place] to [place] in 2014. She moved to [place] to be closer to her father and two sisters in December 2017 and to be in a financially better position. She has been regretting her moved to [place] and would now like to move to [place] to be closer again to family. She has lately noticed worsening depressive symptoms. She denies any current psychosocial or relationship stressors. She works part-time from home and has lately noticed that she has been making some small mistakes at home. She endorses passive thoughts of death on a regular basis but denies any recent suicide attempt or having current active thoughts, intent or plan of wanting to hurt herself or others. She states that her Restorationism jo-ann is her biggest protective factor. Patient appears a bit sad this week, which she relates to the holidays. She has decided to move to [place] and live in an adult apartment complex, which she has mixed feelings. Talked with patient about her plans for work and leisure when she moves. 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 Patient is also considering some options for part time employment in [place].      Patient did not present in acute distress; however, remains quite depressed. Patient was appropriately groomed and cooperative. Patient was alert and oriented to person. Place, and time. Eye contact  was appropriate. No abnormalities in attention or concentration were noted. No abnormalities of movement present; psychomotor activity was normal. Speech was fluent and regular in rhythm, rate, volume, and tone. Thought processes were linear, logical, and goal-directed. There was no evidence of thought disorder. No auditory or visual hallucinations. Long and short term memory appeared to be intact. Insight, judgment, and impulse control were deemed to be within normal limits. Reported mood was fairly positive. Affect was appropriate and congruent with thought content and conversation. Patient denied current suicidal and homicidal ideation in plan, intent, and preparatory behavior      Objective/Observations:  Participation: Active verbal participation, Attentive, Engaged and Open to feedback  Grooming: Casual and Neat  Cognition: Alert and Fully Oriented  Eye contact: Good  Mood: Depressed  Affect: Flexible  Thought process: Logical  Speech: Rate within normal limits    Diagnoses:   1. Major depressive disorder, recurrent episode, moderate ([facility]CC)      Psychometric Test Results:                                                                                                               BHM-20  Global Mental Health  2  Moderate Distress  Well Being Scale                  Moderate Distress  Symptoms Scale                                Mild Distress  Anxiety Subscale                    Mild Distress  Depression Subscale             Severe Distress  Alcohol/Drug Subscale           Within Normal Limits  Bipolar Subscale                     Within Normal Limits  Eating Disorder Subscale       Within Normal Limits  Harm to other Subscale          Within Normal Limits  Suicide Monitoring Scale        No Indication of Rsik  Life Functioning Scale              Severe Distress       Current risk:   SUICIDE: Low   Homicide: Low   Self-harm: Low   Relapse: Not applicable   Other:    Safety Plan reviewed? Not Indicated   If  evidence of imminent risk is present, intervention/plan:     Therapeutic Intervention(s): Cognitive modification, Distress tolerance skills, Leisure and recreation skills, Problem-solving and Supportive psychotherapy    Treatment Goal(s)/Objective(s) addressed:   Objective A: Patient will increase activity level by will participating in at least two hours, three times per week in a social or leisure activity with family member or close friend.  Objective B: Patient will express an increase in positive statements about self by will making at least five positive statements per day about self or her circumstances.  Objective C: Patient will spend more time with peers/colleagues in social situations by spending at least thirty minutes four times per week in a social situation, interacting appropriately with a friend.    Progress toward Treatment Goals: Mild improvement    Plan:  - Continue Individual therapy    Mateusz Ulloa, Ph.D.  12/17/2018

## 2018-12-21 ENCOUNTER — HOSPITAL ENCOUNTER (OUTPATIENT)
Dept: RADIOLOGY | Facility: MEDICAL CENTER | Age: 73
End: 2018-12-21

## 2018-12-31 RX ORDER — ERGOCALCIFEROL 1.25 MG/1
50000 CAPSULE ORAL
Qty: 12 CAP | Refills: 3 | Status: SHIPPED | OUTPATIENT
Start: 2018-12-31 | End: 2019-02-05 | Stop reason: SDUPTHER

## 2018-12-31 RX ORDER — INSULIN GLARGINE 100 [IU]/ML
14 INJECTION, SOLUTION SUBCUTANEOUS 2 TIMES DAILY
Qty: 10 ML | Refills: 3 | Status: SHIPPED | OUTPATIENT
Start: 2018-12-31 | End: 2019-01-07 | Stop reason: CLARIF

## 2019-01-05 ENCOUNTER — HOSPITAL ENCOUNTER (OUTPATIENT)
Dept: LAB | Facility: MEDICAL CENTER | Age: 74
End: 2019-01-05
Attending: INTERNAL MEDICINE
Payer: MEDICARE

## 2019-01-05 DIAGNOSIS — E03.9 ACQUIRED HYPOTHYROIDISM: ICD-10-CM

## 2019-01-05 LAB
T4 FREE SERPL-MCNC: 1.11 NG/DL (ref 0.53–1.43)
TSH SERPL DL<=0.005 MIU/L-ACNC: 1.32 UIU/ML (ref 0.38–5.33)

## 2019-01-05 PROCEDURE — 84439 ASSAY OF FREE THYROXINE: CPT

## 2019-01-05 PROCEDURE — 84443 ASSAY THYROID STIM HORMONE: CPT

## 2019-01-05 PROCEDURE — 36415 COLL VENOUS BLD VENIPUNCTURE: CPT

## 2019-01-07 ENCOUNTER — TELEPHONE (OUTPATIENT)
Dept: ENDOCRINOLOGY | Facility: MEDICAL CENTER | Age: 74
End: 2019-01-07

## 2019-01-07 DIAGNOSIS — E10.311 TYPE 1 DIABETES MELLITUS WITH RETINOPATHY AND MACULAR EDEMA, UNSPECIFIED LATERALITY, UNSPECIFIED RETINOPATHY SEVERITY (HCC): ICD-10-CM

## 2019-01-07 RX ORDER — INSULIN GLARGINE 100 [IU]/ML
14 INJECTION, SOLUTION SUBCUTANEOUS 2 TIMES DAILY
Qty: 27 ML | Refills: 2 | Status: SHIPPED | OUTPATIENT
Start: 2019-01-07 | End: 2019-02-05

## 2019-01-08 ENCOUNTER — OFFICE VISIT (OUTPATIENT)
Dept: BEHAVIORAL HEALTH | Facility: CLINIC | Age: 74
End: 2019-01-08
Payer: MEDICARE

## 2019-01-08 DIAGNOSIS — F33.1 MAJOR DEPRESSIVE DISORDER, RECURRENT EPISODE, MODERATE (HCC): ICD-10-CM

## 2019-01-08 PROCEDURE — 90834 PSYTX W PT 45 MINUTES: CPT | Performed by: PSYCHOLOGIST

## 2019-01-08 NOTE — BH THERAPY
" [facility]  Therapy Progress Note    Patient Name: Geraldine Scott  Patient MRN: [id number]  Today's Date: 1/8/2019     Type of session:Individual psychotherapy  Length of session: 45 minutes  Persons in attendance:Patient    Subjective/New Info:   Patient is a 72 year old female with history of diabetes mellitus type 1, hypertension, dyslipidemia, and hypothyroidism. She reports struggling with depression on and off since her late 30s/early 40s. She has been off antidepressants for over 10 years but has noticed on and off worsening depression since she moved from [place] to [place] in 2014. She moved to [place] to be closer to her father and two sisters in December 2017 and to be in a financially better position. She has been regretting her moved to [place] and would now like to move to [place] to be closer again to family. She has lately noticed worsening depressive symptoms. She denies any current psychosocial or relationship stressors. She works part-time from home and has lately noticed that she has been making some small mistakes at home. She endorses passive thoughts of death on a regular basis but denies any recent suicide attempt or having current active thoughts, intent or plan of wanting to hurt herself or others. She states that her Taoism jo-ann is her biggest protective factor. Patient has been feeling quite overwhelmed the past week, not sure about where she would like to live or what she should do with her job. Talked with patient about about her feelings of \"obligation\" to her family. She has been dealing with some quite a bit of guilt, and has been making decisions based on her feelings of guilt versus what she thinks that she would enjoy. Patient is going to focus on where she would like to live, and take one step at a time instead of trying to figure everything out at once.     Patient did not present in acute distress; however, remains quite depressed. Patient was appropriately " groomed and cooperative. Patient was alert and oriented to person. Place, and time. Eye contact was appropriate. No abnormalities in attention or concentration were noted. No abnormalities of movement present; psychomotor activity was normal. Speech was fluent and regular in rhythm, rate, volume, and tone. Thought processes were linear, logical, and goal-directed. There was no evidence of thought disorder. No auditory or visual hallucinations. Long and short term memory appeared to be intact. Insight, judgment, and impulse control were deemed to be within normal limits. Reported mood was fairly positive. Affect was appropriate and congruent with thought content and conversation. Patient denied current suicidal and homicidal ideation in plan, intent, and preparatory behavior     Objective/Observations:  Participation: Active verbal participation, Attentive, Engaged and Open to feedback  Grooming: Casual and Neat  Cognition: Alert and Fully Oriented  Eye contact: Good  Mood: Depressed  Affect: Flexible  Thought process: Logical  Speech: Rate within normal limits    Diagnoses:   1. Major depressive disorder, recurrent episode, moderate ([facility])      Psychometric Test Results:   BHM-20  Global Mental Health 2                       Moderate Distress  Well Being Scale                           Moderate Distress  Symptoms Scale                               Mild Distress  Anxiety Subscale                   Mild Distress  Depression Subscale            Severe Distress  Alcohol/Drug Subscale          Within Normal Limits  Bipolar Subscale                   Within Normal Limits  Eating Disorder Subscale     Within Normal Limits  Harm to other Subscale        Within Normal Limits  Suicide Monitoring Scale       No Indication of Risk  Life Functioning Scale                       Severe Distress    Current risk:   SUICIDE: Low   Homicide: Low   Self-harm: Low   Relapse: Not applicable   Other:    Safety Plan reviewed? Not  Indicated   If evidence of imminent risk is present, intervention/plan:     Therapeutic Intervention(s): Cognitive modification, Distress tolerance skills, Goal-setting, Leisure and recreation skills and Supportive psychotherapy    Treatment Goal(s)/Objective(s) addressed:   Patient will experience a significant reduction in depressive symptoms:  Objective A: Patient will increase activity level by will participating in at least two hours, three times per week in a social or leisure activity with family member or close friend.  Objective B: Patient will express an increase in positive statements about self by will making at least five positive statements per day about self or her circumstances.  Objective C: Patient will spend more time with peers/colleagues in social situations by spending at least thirty minutes four times per week in a social situation, interacting appropriately with a friend.       Progress toward Treatment Goals: Mild improvement    Plan:  - Continue Individual therapy    Mateusz Ulloa, Ph.D.  1/8/2019

## 2019-01-25 ENCOUNTER — OFFICE VISIT (OUTPATIENT)
Dept: BEHAVIORAL HEALTH | Facility: CLINIC | Age: 74
End: 2019-01-25
Payer: MEDICARE

## 2019-01-25 DIAGNOSIS — F33.1 MAJOR DEPRESSIVE DISORDER, RECURRENT EPISODE, MODERATE (HCC): ICD-10-CM

## 2019-01-25 PROCEDURE — 90834 PSYTX W PT 45 MINUTES: CPT | Performed by: PSYCHOLOGIST

## 2019-01-25 NOTE — BH THERAPY
[facility]  Therapy Progress Note    Patient Name: Geraldine Scott  Patient MRN: [id number]  Today's Date: 1/25/2019     Type of session:Individual psychotherapy  Length of session: 45 minutes  Persons in attendance:Patient    Subjective/New Info:   Patient is a 73 year old female with history of diabetes mellitus type 1, hypertension, dyslipidemia, and hypothyroidism. She reports struggling with depression on and off since her late 30s/early 40s. She has been off antidepressants for over 10 years but has noticed on and off worsening depression since she moved from [place] to [place] in 2014. She moved to [place] to be closer to her father and two sisters in December 2017 and to be in a financially better position. She has been regretting her moved to [place] and would now like to move to [place] to be closer again to family. She has lately noticed worsening depressive symptoms. She denies any current psychosocial or relationship stressors. She works part-time from home and has lately noticed that she has been making some small mistakes at home. Patient has decided to stay in the [place] area for another year. She has been increasing her activity level and socialization. She is also going to start going to the fitness center. Talked with patient about how she might increase her socialization and meet some people in which she might develop a friendship. Patient is also considering some things that she might do to improve the chances of getting into a relationship. She reported that for the most part her mood has been better, with a few days of feeling a bit sad.     Patient did not present in acute distress and she appeared less depressed. Patient was appropriately groomed and cooperative. Patient was alert and oriented to person. Place, and time. Eye contact was appropriate. No abnormalities in attention or concentration were noted. No abnormalities of movement present; psychomotor activity was normal.  Speech was fluent and regular in rhythm, rate, volume, and tone. Thought processes were linear, logical, and goal-directed. There was no evidence of thought disorder. No auditory or visual hallucinations. Long and short term memory appeared to be intact. Insight, judgment, and impulse control were deemed to be within normal limits. Reported mood was improving. Affect was appropriate and congruent with thought content and conversation. Patient denied current suicidal and homicidal ideation in plan, intent, and preparatory behavior     Objective/Observations:  Participation: Active verbal participation, Attentive, Engaged and Open to feedback  Grooming: Casual and Neat  Cognition: Alert and Fully Oriented  Eye contact: Good  Mood: Depressed  Affect: Flexible  Thought process: Logical  Speech: Rate within normal limits     Diagnoses:   1. Major depressive disorder, recurrent episode, moderate ([facility])       Psychometric Test Results:   BHM-20  Global Mental Health                           Moderate Distress  Well Being Scale                   Moderate Distress  Symptoms Scale    Mild Distress  Anxiety Subscale   Mild Distress  Depression Subscale  Moderate Distress  Alcohol/Drug Subscale  Within Normal Limits  Bipolar Subscale   Within Normal Limits  Eating Disorder Subscale  Within Normal Limits  Harm to other Subscale  Within Normal Limits  Suicide Monitoring Scale  No Indication of Risk  Life Functioning Scale                        Moderate Distress    Current risk:   SUICIDE: Low   Homicide: Low   Self-harm: Low   Relapse: Not applicable   Other:    Safety Plan reviewed? No   If evidence of imminent risk is present, intervention/plan:     Therapeutic Intervention(s): Cognitive modification, Goal-setting, Interpersonal effectiveness skills, Leisure and recreation skills, Positive behavior reinforced and Supportive psychotherapy    Treatment Goal(s)/Objective(s) addressed:   Reduce depression:  Objective A: Patient  will increase activity level by will participating in at least two hours, three times per week in a social or leisure activity with family member or close friend.  Objective B: Patient will express an increase in positive statements about self by will making at least five positive statements per day about self or her circumstances.  Objective C: Patient will spend more time with peers/colleagues in social situations by spending at least thirty minutes four times per week in a social situation, interacting appropriately with a friend.    Progress toward Treatment Goals: Mild improvement    Plan:  - Continue Individual therapy    Mateusz Ulloa, Ph.D.  1/25/2019

## 2019-02-05 ENCOUNTER — OFFICE VISIT (OUTPATIENT)
Dept: ENDOCRINOLOGY | Facility: MEDICAL CENTER | Age: 74
End: 2019-02-05
Payer: MEDICARE

## 2019-02-05 VITALS
HEART RATE: 96 BPM | BODY MASS INDEX: 35.61 KG/M2 | WEIGHT: 201 LBS | SYSTOLIC BLOOD PRESSURE: 100 MMHG | HEIGHT: 63 IN | DIASTOLIC BLOOD PRESSURE: 60 MMHG | OXYGEN SATURATION: 97 %

## 2019-02-05 DIAGNOSIS — E53.8 VITAMIN B 12 DEFICIENCY: ICD-10-CM

## 2019-02-05 DIAGNOSIS — I10 ESSENTIAL HYPERTENSION: ICD-10-CM

## 2019-02-05 DIAGNOSIS — E10.319 TYPE 1 DIABETES MELLITUS WITH RETINOPATHY, MACULAR EDEMA PRESENCE UNSPECIFIED, UNSPECIFIED LATERALITY, UNSPECIFIED RETINOPATHY SEVERITY (HCC): ICD-10-CM

## 2019-02-05 DIAGNOSIS — E03.9 ACQUIRED HYPOTHYROIDISM: ICD-10-CM

## 2019-02-05 DIAGNOSIS — E55.9 VITAMIN D DEFICIENCY: ICD-10-CM

## 2019-02-05 DIAGNOSIS — E10.65 UNCONTROLLED TYPE 1 DIABETES MELLITUS WITH HYPERGLYCEMIA (HCC): ICD-10-CM

## 2019-02-05 LAB
HBA1C MFR BLD: 8.3 % (ref ?–5.8)
INT CON NEG: NORMAL
INT CON POS: NORMAL

## 2019-02-05 PROCEDURE — 95251 CONT GLUC MNTR ANALYSIS I&R: CPT | Performed by: INTERNAL MEDICINE

## 2019-02-05 PROCEDURE — 99214 OFFICE O/P EST MOD 30 MIN: CPT | Performed by: INTERNAL MEDICINE

## 2019-02-05 PROCEDURE — 83036 HEMOGLOBIN GLYCOSYLATED A1C: CPT | Performed by: INTERNAL MEDICINE

## 2019-02-05 RX ORDER — LISINOPRIL 20 MG/1
20 TABLET ORAL DAILY
Qty: 90 TAB | Refills: 3 | Status: SHIPPED | OUTPATIENT
Start: 2019-02-05

## 2019-02-05 RX ORDER — PEN NEEDLE, DIABETIC 32GX 5/32"
1 NEEDLE, DISPOSABLE MISCELLANEOUS
Qty: 600 EACH | Refills: 3 | Status: SHIPPED | OUTPATIENT
Start: 2019-02-05

## 2019-02-05 RX ORDER — LEVOTHYROXINE SODIUM 112 UG/1
TABLET ORAL
Qty: 90 TAB | Refills: 3 | Status: SHIPPED | OUTPATIENT
Start: 2019-02-05

## 2019-02-05 RX ORDER — ERGOCALCIFEROL 1.25 MG/1
50000 CAPSULE ORAL
Qty: 12 CAP | Refills: 3 | Status: SHIPPED | OUTPATIENT
Start: 2019-02-05

## 2019-02-09 ENCOUNTER — HOSPITAL ENCOUNTER (OUTPATIENT)
Dept: LAB | Facility: MEDICAL CENTER | Age: 74
End: 2019-02-09
Attending: INTERNAL MEDICINE
Payer: MEDICARE

## 2019-02-09 DIAGNOSIS — I10 ESSENTIAL HYPERTENSION: ICD-10-CM

## 2019-02-09 DIAGNOSIS — E10.319 TYPE 1 DIABETES MELLITUS WITH RETINOPATHY, MACULAR EDEMA PRESENCE UNSPECIFIED, UNSPECIFIED LATERALITY, UNSPECIFIED RETINOPATHY SEVERITY (HCC): ICD-10-CM

## 2019-02-09 DIAGNOSIS — E53.8 VITAMIN B 12 DEFICIENCY: ICD-10-CM

## 2019-02-09 DIAGNOSIS — E10.65 UNCONTROLLED TYPE 1 DIABETES MELLITUS WITH HYPERGLYCEMIA (HCC): ICD-10-CM

## 2019-02-09 DIAGNOSIS — E55.9 VITAMIN D DEFICIENCY: ICD-10-CM

## 2019-02-09 DIAGNOSIS — E03.9 ACQUIRED HYPOTHYROIDISM: ICD-10-CM

## 2019-02-09 LAB
25(OH)D3 SERPL-MCNC: 43 NG/ML (ref 30–100)
ANION GAP SERPL CALC-SCNC: 8 MMOL/L (ref 0–11.9)
BUN SERPL-MCNC: 16 MG/DL (ref 8–22)
CALCIUM SERPL-MCNC: 8.8 MG/DL (ref 8.5–10.5)
CHLORIDE SERPL-SCNC: 104 MMOL/L (ref 96–112)
CO2 SERPL-SCNC: 25 MMOL/L (ref 20–33)
CREAT SERPL-MCNC: 0.77 MG/DL (ref 0.5–1.4)
CREAT UR-MCNC: 81.3 MG/DL
FASTING STATUS PATIENT QL REPORTED: NORMAL
GLUCOSE SERPL-MCNC: 190 MG/DL (ref 65–99)
MICROALBUMIN UR-MCNC: <0.7 MG/DL
MICROALBUMIN/CREAT UR: NORMAL MG/G (ref 0–30)
POTASSIUM SERPL-SCNC: 4 MMOL/L (ref 3.6–5.5)
SODIUM SERPL-SCNC: 137 MMOL/L (ref 135–145)
T3 SERPL-MCNC: 70.7 NG/DL (ref 60–181)
T4 FREE SERPL-MCNC: 1.01 NG/DL (ref 0.53–1.43)
TSH SERPL DL<=0.005 MIU/L-ACNC: 1.88 UIU/ML (ref 0.38–5.33)
VIT B12 SERPL-MCNC: 554 PG/ML (ref 211–911)

## 2019-02-09 PROCEDURE — 36415 COLL VENOUS BLD VENIPUNCTURE: CPT

## 2019-02-09 PROCEDURE — 82306 VITAMIN D 25 HYDROXY: CPT

## 2019-02-09 PROCEDURE — 82043 UR ALBUMIN QUANTITATIVE: CPT

## 2019-02-09 PROCEDURE — 82570 ASSAY OF URINE CREATININE: CPT

## 2019-02-09 PROCEDURE — 84443 ASSAY THYROID STIM HORMONE: CPT

## 2019-02-09 PROCEDURE — 84681 ASSAY OF C-PEPTIDE: CPT

## 2019-02-09 PROCEDURE — 84439 ASSAY OF FREE THYROXINE: CPT

## 2019-02-09 PROCEDURE — 80048 BASIC METABOLIC PNL TOTAL CA: CPT

## 2019-02-09 PROCEDURE — 86341 ISLET CELL ANTIBODY: CPT | Mod: 91

## 2019-02-09 PROCEDURE — 82607 VITAMIN B-12: CPT

## 2019-02-09 PROCEDURE — 84480 ASSAY TRIIODOTHYRONINE (T3): CPT

## 2019-02-12 ENCOUNTER — APPOINTMENT (OUTPATIENT)
Dept: BEHAVIORAL HEALTH | Facility: CLINIC | Age: 74
End: 2019-02-12
Payer: MEDICARE

## 2019-02-13 LAB — C PEPTIDE SERPL-MCNC: <0.1 NG/ML (ref 0.8–3.5)

## 2019-02-14 ENCOUNTER — TELEPHONE (OUTPATIENT)
Dept: ENDOCRINOLOGY | Facility: MEDICAL CENTER | Age: 74
End: 2019-02-14

## 2019-02-14 LAB
GAD65 AB SER IA-ACNC: <5 IU/ML (ref 0–5)
ISLET CELL512 AB SER-ACNC: 1.1 U/ML (ref 0–0.8)

## 2019-02-21 ENCOUNTER — OFFICE VISIT (OUTPATIENT)
Dept: MEDICAL GROUP | Facility: MEDICAL CENTER | Age: 74
End: 2019-02-21
Payer: MEDICARE

## 2019-02-21 VITALS
OXYGEN SATURATION: 95 % | RESPIRATION RATE: 14 BRPM | HEIGHT: 62 IN | TEMPERATURE: 98.4 F | DIASTOLIC BLOOD PRESSURE: 70 MMHG | WEIGHT: 203.71 LBS | SYSTOLIC BLOOD PRESSURE: 132 MMHG | BODY MASS INDEX: 37.49 KG/M2 | HEART RATE: 82 BPM

## 2019-02-21 DIAGNOSIS — Z28.39 INCOMPLETE IMMUNIZATION STATUS: ICD-10-CM

## 2019-02-21 DIAGNOSIS — E10.311 TYPE 1 DIABETES MELLITUS WITH RETINOPATHY AND MACULAR EDEMA, UNSPECIFIED LATERALITY, UNSPECIFIED RETINOPATHY SEVERITY (HCC): ICD-10-CM

## 2019-02-21 PROCEDURE — 99214 OFFICE O/P EST MOD 30 MIN: CPT | Performed by: PHYSICIAN ASSISTANT

## 2019-02-21 NOTE — LETTER
[facility]  Clay Maguire P.A.-C.  [address]  [place] [address]  Fax: [phone]   Authorization for Release/Disclosure of   Protected Health Information   Name: GERALDINE PARDO : 1945 SSN: [id number]   Address: [address] [place] [address]  [address]  [place] [address] Phone:    [phone] (home)    I authorize the entity listed below to release/disclose the PHI below to:   [facility]/Clay Maguire P.A.-C. and Clay Maguire P.A.-C.   Provider or Entity Name:  Dr. bIan Adames, [facility]   Address   City, State, Zip  [place] Phone:      Fax:     Reason for request: continuity of care   Information to be released:    [  ] LAST COLONOSCOPY,  including any PATH REPORT and follow-up  [  ] LAST FIT/COLOGUARD RESULT [  ] LAST DEXA  [  ] LAST MAMMOGRAM  [  ] LAST PAP  [  ] LAST LABS [  ] RETINA EXAM REPORT  [ X ] IMMUNIZATION RECORDS  [  ] Release all info      [  ] Check here and initial the line next to each item to release ALL health information INCLUDING  _____ Care and treatment for drug and / or alcohol abuse  _____ HIV testing, infection status, or AIDS  _____ Genetic Testing    DATES OF SERVICE OR TIME PERIOD TO BE DISCLOSED: _____________  I understand and acknowledge that:  * This Authorization may be revoked at any time by you in writing, except if your health information has already been used or disclosed.  * Your health information that will be used or disclosed as a result of you signing this authorization could be re-disclosed by the recipient. If this occurs, your re-disclosed health information may no longer be protected by State or Federal laws.  * You may refuse to sign this Authorization. Your refusal will not affect your ability to obtain treatment.  * This Authorization becomes effective upon signing and will  on (date) __________.      If no date is indicated, this Authorization will  one (1) year from the signature date.    Name: Geraldine Edge  Tyler    Signature:   Date:     2/21/2019       PLEASE FAX REQUESTED RECORDS BACK TO: [phone]

## 2019-02-21 NOTE — ASSESSMENT & PLAN NOTE
This is a 73-year-old female who is here today to discuss her diabetes.  She is been seen with endocrinology.  Is happy with her progress there.  Recent A1c did increase to 8.3.  Was 7.5.  They are in the process of going through in getting her an insulin pump.  She requests no medication renewals today.  Is using Lantus as well as Humalog.

## 2019-02-21 NOTE — LETTER
[facility]  Clay Maguire P.A.-C.  [address]  [place] [address]  Fax: [phone]   Authorization for Release/Disclosure of   Protected Health Information   Name: GERALDINE PARDO : 1945 SSN: [id number]   Address: [address] [place] [address] [address]  [place] [address] Phone:    [phone] (home)    I authorize the entity listed below to release/disclose the PHI below to:   [facility]/Clay Maguire P.A.-C. and Clay Maguire P.A.-C.   Provider or Entity Name:  [facility]   Address   City, State, Zip   Phone:      Fax:     Reason for request: continuity of care   Information to be released:    [  ] LAST COLONOSCOPY,  including any PATH REPORT and follow-up  [  ] LAST FIT/COLOGUARD RESULT [  ] LAST DEXA  [  ] LAST MAMMOGRAM  [  ] LAST PAP  [  ] LAST LABS [  ] RETINA EXAM REPORT  [X  ] IMMUNIZATION RECORDS  [  ] Release all info      [  ] Check here and initial the line next to each item to release ALL health information INCLUDING  _____ Care and treatment for drug and / or alcohol abuse  _____ HIV testing, infection status, or AIDS  _____ Genetic Testing    DATES OF SERVICE OR TIME PERIOD TO BE DISCLOSED: _____________  I understand and acknowledge that:  * This Authorization may be revoked at any time by you in writing, except if your health information has already been used or disclosed.  * Your health information that will be used or disclosed as a result of you signing this authorization could be re-disclosed by the recipient. If this occurs, your re-disclosed health information may no longer be protected by State or Federal laws.  * You may refuse to sign this Authorization. Your refusal will not affect your ability to obtain treatment.  * This Authorization becomes effective upon signing and will  on (date) __________.      If no date is indicated, this Authorization will  one (1) year from the signature date.    Name: Geraldine Pardo    Signature:   Date:     2019          PLEASE FAX REQUESTED RECORDS BACK TO: [phone]

## 2019-02-21 NOTE — ASSESSMENT & PLAN NOTE
She states that she got her Tdap vaccination previously in [place].  Also received her pneumonia vaccines at [facility].

## 2019-02-26 ENCOUNTER — OFFICE VISIT (OUTPATIENT)
Dept: BEHAVIORAL HEALTH | Facility: CLINIC | Age: 74
End: 2019-02-26
Payer: MEDICARE

## 2019-02-26 DIAGNOSIS — F33.1 MAJOR DEPRESSIVE DISORDER, RECURRENT EPISODE, MODERATE (HCC): ICD-10-CM

## 2019-02-26 PROCEDURE — 90834 PSYTX W PT 45 MINUTES: CPT | Performed by: PSYCHOLOGIST

## 2019-02-26 NOTE — BH THERAPY
[facility]  Therapy Progress Note    Patient Name: Geraldine Scott  Patient MRN: [id number]  Today's Date: 2/26/2019     Type of session:Individual psychotherapy  Length of session: 45 minutes  Persons in attendance:Patient    Subjective/New Info:   Patient is a 73 year old female with history of diabetes mellitus type 1, hypertension, dyslipidemia, and hypothyroidism. She reports struggling with depression on and off since her late 30s/early 40s. She has been off antidepressants for over 10 years but has noticed on and off worsening depression since she moved from [place] to [place] in 2014. She moved to [place] to be closer to her father and two sisters in December 2017 and to be in a financially better position. She has been regretting her moved to [place] and would now like to move to [place] to be closer again to family. She has lately noticed worsening depressive symptoms. She denies any current psychosocial or relationship stressors. She works part-time from home and has lately noticed that she has been making some small mistakes at home. Patient appears to be doing a bit better. She is dealing with her father's illness who is 98 year old and living in [place]. She stated that he is going to be going into hospice with advanced gall bladder cancer and she hopes to go visit him in the next few weeks. Her moods have been a bit labile as she struggles with what her long term plans are for living in [place] versus moving to [place]. Talked with patient about her confusions and her concern about her father.     Patient did not present in acute distress and she appeared less depressed. Patient was appropriately groomed and cooperative. Patient was alert and oriented to person. Place, and time. Eye contact was appropriate. No abnormalities in attention or concentration were noted. No abnormalities of movement present; psychomotor activity was normal. Speech was fluent and regular in rhythm, rate,  volume, and tone. Thought processes were linear, logical, and goal-directed. There was no evidence of thought disorder. No auditory or visual hallucinations. Long and short term memory appeared to be intact. Insight, judgment, and impulse control were deemed to be within normal limits. Reported mood was improving. Affect was appropriate and congruent with thought content and conversation. Patient denied current suicidal and homicidal ideation in plan, intent, and preparatory behavior     Objective/Observations:  Participation: Active verbal participation, Attentive, Engaged and Open to feedback  Grooming: Casual and Neat  Cognition: Alert and Fully Oriented  Eye contact: Good  Mood: Depressed  Affect: Flexible  Thought process: Logical  Speech: Rate within normal limits    Diagnoses:   1. Major depressive disorder, recurrent episode, moderate (HCC)       Current risk:   SUICIDE: Low   Homicide: Low   Self-harm: Low   Relapse: Not applicable   Other:    Safety Plan reviewed? Not Indicated   If evidence of imminent risk is present, intervention/plan:     Therapeutic Intervention(s): Cognitive modification, Conflict clarification, Conflict resolution skills, Leisure and recreation skills and Supportive psychotherapy    Treatment Goal(s)/Objective(s) addressed:   Reduce depression:  Objective A: Patient will increase activity level by will participating in at least two hours, three times per week in a social or leisure activity with family member or close friend.  Objective B: Patient will express an increase in positive statements about self by will making at least five positive statements per day about self or her circumstances.  Objective C: Patient will spend more time with peers/colleagues in social situations by spending at least thirty minutes four times per week in a social situation, interacting appropriately with a friend.     Progress toward Treatment Goals: Mild improvement    Plan:  - Continue Individual  therapy    Mateusz Ulloa, Ph.D.  2/26/2019

## 2019-03-16 ENCOUNTER — PATIENT MESSAGE (OUTPATIENT)
Dept: ENDOCRINOLOGY | Facility: MEDICAL CENTER | Age: 74
End: 2019-03-16

## 2019-03-16 DIAGNOSIS — E10.65 TYPE 1 DIABETES MELLITUS WITH HYPERGLYCEMIA (HCC): ICD-10-CM

## 2019-03-18 RX ORDER — INSULIN LISPRO 100 [IU]/ML
10 INJECTION, SOLUTION INTRAVENOUS; SUBCUTANEOUS
Qty: 10 PEN | Refills: 3 | Status: SHIPPED | OUTPATIENT
Start: 2019-03-18 | End: 2019-03-22 | Stop reason: SDUPTHER

## 2019-03-18 NOTE — PATIENT COMMUNICATION
Please place lancets order  Was the patient seen in the last year in this department? Yes    Does patient have an active prescription for medications requested? No     Received Request Via: Pharmacy   Needs humalog pens and lancets

## 2019-03-18 NOTE — TELEPHONE ENCOUNTER
From: Geraldine Scott  To: Lev Jackson M.D.  Sent: 3/16/2019 10:46 AM PDT  Subject: Prescription Question    My understanding is that [facility] taxed you a request for a refill of my Humolog pens and Accu-chek Fast Clix lancets. Can you send them approval for these refills? Thanks.

## 2019-03-20 ENCOUNTER — APPOINTMENT (OUTPATIENT)
Dept: BEHAVIORAL HEALTH | Facility: CLINIC | Age: 74
End: 2019-03-20
Payer: MEDICARE

## 2019-03-22 DIAGNOSIS — E10.65 TYPE 1 DIABETES MELLITUS WITH HYPERGLYCEMIA (HCC): ICD-10-CM

## 2019-03-22 RX ORDER — INSULIN LISPRO 100 [IU]/ML
10 INJECTION, SOLUTION INTRAVENOUS; SUBCUTANEOUS
Qty: 10 PEN | Refills: 3 | Status: SHIPPED | OUTPATIENT
Start: 2019-03-22

## 2019-03-26 ENCOUNTER — OFFICE VISIT (OUTPATIENT)
Dept: BEHAVIORAL HEALTH | Facility: CLINIC | Age: 74
End: 2019-03-26
Payer: MEDICARE

## 2019-03-26 DIAGNOSIS — F33.1 MAJOR DEPRESSIVE DISORDER, RECURRENT EPISODE, MODERATE (HCC): ICD-10-CM

## 2019-03-26 PROCEDURE — 90834 PSYTX W PT 45 MINUTES: CPT | Performed by: PSYCHOLOGIST

## 2019-03-26 NOTE — BH THERAPY
[facility]  Therapy Progress Note    Patient Name: Geraldine Scott  Patient MRN: [id number]  Today's Date: 3/26/2019     Type of session:Individual psychotherapy  Length of session: 45 minutes  Persons in attendance:Patient    Subjective/New Info:   Patient is a 73 year old female with history of diabetes mellitus type 1, hypertension, dyslipidemia, and hypothyroidism. She reports struggling with depression on and off since her late 30s/early 40s. She has been off antidepressants for over 10 years but has noticed on and off worsening depression since she moved from [place] to [place] in 2014. She moved to [place] to be closer to her father and two sisters in December 2017 and to be in a financially better position. Patient appears to be doing a bit better, although she is dealing with several uncertainties. She has decided to quit her job because the stress is not worth the salary. She is also dealing with her father's late stage cancer. Talked with patient about how she might prioritize her stresses and confront one at a time, as she is also thinking about moving to [place] or [place].      Patient did not present in acute distress and she appeared less depressed. Patient was appropriately groomed and cooperative. Patient was alert and oriented to person. Place, and time. Eye contact was appropriate. No abnormalities in attention or concentration were noted. No abnormalities of movement present; psychomotor activity was normal. Speech was fluent and regular in rhythm, rate, volume, and tone. Thought processes were linear, logical, and goal-directed. There was no evidence of thought disorder. No auditory or visual hallucinations. Long and short term memory appeared to be intact. Insight, judgment, and impulse control were deemed to be within normal limits. Reported mood was improving. Affect was appropriate and congruent with thought content and conversation. Patient denied current suicidal and  homicidal ideation in plan, intent, and preparatory behavior     Objective/Observations:  Participation: Active verbal participation, Attentive, Engaged and Open to feedback  Grooming: Casual and Neat  Cognition: Alert and Fully Oriented  Eye contact: Good  Mood: Depressed  Affect: Flexible  Thought process: Logical  Speech: Rate within normal limits       Diagnoses:   1. Major depressive disorder, recurrent episode, moderate (HCC)         Current risk:   SUICIDE: Low   Homicide: Low   Self-harm: Low   Relapse: Not applicable   Other:    Safety Plan reviewed? Not Indicated   If evidence of imminent risk is present, intervention/plan:     Therapeutic Intervention(s): Cognitive modification, Conflict resolution skills, Leisure and recreation skills, Self-care skills, Stressors assessed and Supportive psychotherapy    Treatment Goal(s)/Objective(s) addressed:   Reduce depression:  Objective A: Patient will increase activity level by will participating in at least two hours, three times per week in a social or leisure activity with family member or close friend.  Objective B: Patient will express an increase in positive statements about self by will making at least five positive statements per day about self or her circumstances.  Objective C: Patient will spend more time with peers/colleagues in social situations by spending at least thirty minutes four times per week in a social situation, interacting appropriately with a friend.      Progress toward Treatment Goals: Mild improvement    Plan:  - Continue Individual therapy    Mateusz Ulloa, Ph.D.  3/26/2019

## 2019-04-01 DIAGNOSIS — E10.311 TYPE 1 DIABETES MELLITUS WITH RETINOPATHY AND MACULAR EDEMA, UNSPECIFIED LATERALITY, UNSPECIFIED RETINOPATHY SEVERITY (HCC): ICD-10-CM

## 2019-04-01 RX ORDER — LANCETS 30 GAUGE
EACH MISCELLANEOUS
Qty: 100 EACH | Refills: 11 | Status: SHIPPED | OUTPATIENT
Start: 2019-04-01

## 2019-04-02 ENCOUNTER — TELEPHONE (OUTPATIENT)
Dept: ENDOCRINOLOGY | Facility: MEDICAL CENTER | Age: 74
End: 2019-04-02

## 2019-04-02 NOTE — TELEPHONE ENCOUNTER
DOCUMENTATION OF PAR STATUS:    1. Name of Medication & Dose: Aiden Contour Test Strips     2. Name of Prescription Coverage Company & phone #: [facility] Ph: [phone]    3. Date Prior Auth Submitted: 4/2/19    4. What information was given to obtain insurance decision? Clinical notes     5. Prior Auth Status? Pending    6. Action Taken: Pharmacy Notified: yes

## 2019-04-02 NOTE — TELEPHONE ENCOUNTER
DOCUMENTATION OF PAR STATUS:    1. Name of Medication & Dose: Humalog Kwikpen U100     2. Name of Prescription Coverage Company & phone #: [facility] Ph: [phone]    3. Date Prior Auth Submitted: 4/2/19    4. What information was given to obtain insurance decision? Clinical notes    5. Prior Auth Status? Pending    6. Action Taken: Pharmacy Notified: yes

## 2019-04-07 NOTE — PROGRESS NOTES
Endocrinology Clinic Progress Note  PCP: Clay Maguire P.A.-C.    HPI:  Geraldine Scott is a 73 y.o. old patient who comes in today for routine follow up of Management of Uncontrolled Type 1 Diabetes, Hyperthyroidism, Hypertension, and Vitamin D Deficiency.    HPI:  Geraldine Scott is a 73 y.o. old patient who comes in today for evaluation of above stated problem.    Most Recent HbA1c:   Lab Results   Component Value Date/Time    HBA1C 8.3 02/05/2019 10:04 AM        Current Diabetes Regimen:    She has a new T-Slim pump and has set up an appointment with the .    Basal Insulin: Lantus 17 units in the am and 16 units at bedtime.    Prandial Insulin:  Humalog 1:6 carbohydrate ratio at meals    Blood sugars: fluctuating between  range. See downloaded dexcom (CGM) data.     Hypoglycemia:  rare    ROS:  Constitutional: No weight loss  Cardiac: No palpitations or racing heart  Resp: No shortness of breath  Neuro: No numbness or tinging in feet  Endo: No heat or cold intolerance, no polyuria or polydipsia  All other systems were reviewed and were negative.    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     Social History Main Topics   • Smoking status: Former Smoker     Packs/day: 0.50     Years: 10.00   • Smokeless tobacco: Never Used   • Alcohol use No      Comment: one drink a month   • Drug use: No   • Sexual activity: Not Currently     Other Topics Concern   • Not on file     Social History Narrative   • No narrative on file       Family History:  Family History   Problem Relation Age of Onset   • Depression Mother    • Arthritis Father    • Heart Disease Father    • No Known Problems Sister    • Diabetes Paternal Grandmother    • Hyperlipidemia Neg Hx        Medications:    Current Outpatient Prescriptions:   •  insulin lispro (HUMALOG) 100 UNIT/ML Solution, Inject 66 Units as instructed Continuous., Disp: 60 mL, Rfl: 3  •  Lancets, Lancets order: Accu-Chek Fastclix lancets. Testing 4 times daily for insulin adjustment., Disp: 100 Each, Rfl: 11  •  Blood Glucose Test Strips, [employer] contour glucometer test strips for BG check 4-5 times a day, Disp: 150 Each, Rfl: 6  •  HUMALOG KWIKPEN 100 UNIT/ML Solution Pen-injector injection, Inject 10 Units as instructed 3 times a day before meals., Disp: 10 PEN, Rfl: 3  •  insulin glargine (LANTUS SOLOSTAR) 100 UNIT/ML Solution Pen-injector injection, Inject 40 Units as instructed every evening., Disp: , Rfl:   •  vitamin D, Ergocalciferol, (DRISDOL) 21634 units Cap capsule, Take 1 Cap by mouth every 7 days., Disp: 12 Cap, Rfl: 3  •  levothyroxine (SYNTHROID) 112 MCG Tab, TAKE ONE TABLET BY MOUTH IN THE MORNING on an empty stomach, Disp: 90 Tab, Rfl: 3  •  lisinopril (PRINIVIL) 20 MG Tab, Take 1 Tab by mouth every day., Disp: 90 Tab, Rfl: 3  •  BD PEN NEEDLE DELMI U/F, Inject 1 Each as instructed 6 Times a Day., Disp: 600 Each, Rfl: 3  •  WELLBUTRIN  MG XL tablet, Take 1 Tab by mouth every morning., Disp: 90 Tab, Rfl: 0  •  atorvastatin (LIPITOR) 20 MG Tab, Take 1 Tab by mouth every bedtime., Disp: 90 Tab, Rfl: 1  •  glucose  "blood strip, 1 Each by Other route as needed., Disp: , Rfl:   •  cholestyramine light (PREVALITE) 4 GM/DOSE powder, Take  by mouth., Disp: , Rfl:     Labs: Reviewed    Physical Examination:  Vital signs: /70   Pulse 87   Ht 1.588 m (5' 2.5\")   Wt 91.6 kg (202 lb)   SpO2 94%   BMI 36.36 kg/m²  Body mass index is 36.36 kg/m².  General: No apparent distress, cooperative  Eyes: No scleral icterus or discharge  ENMT: Normal on external inspection of nose, lips, normal thyroid exam  Neck: No abnormal masses on inspection  Resp: Normal effort, clear to auscultation bilaterally   CVS: Regular rate and rhythm, S1 S2 normal, no murmur   Extremities: No edema  Abdomen: abdominal obesity present  Neuro: Alert and oriented  Skin: No rash  Psych: Normal mood and affect, intact memory and able to make informed decisions    Foot Exam:  Monofilament: done  Monofilament testing with a 10 gram force: sensation intact: intact bilaterally  Visual Inspection: Feet without maceration, ulcers, fissures.  Pedal pulses: intact bilaterally    Assessment and Plan:    1. Uncontrolled type 1.5 diabetes mellitus with hyperglycemia (HCC)  She will benefit from Tandem-dexcom G 6 system which she has already received.   Dexcom( CGM) data were downloaded and reports discussed and interpreted with patient in great detail.  Dexcom was down loaded and the results interpreted. The reports were reviewed with the patient in great detail.  - Discussed diabetic diet discussed in detail-plate method and carbohydrate counting.  - Discussed importance of immunizations and yearly eye exams. She saw Dr. Sanchez on 1/30/19  - Advised daily foot exams. Educated on signs of infection.   - Educated on need to stay well hydrated with water.  - Educated to call with any questions or problems.     2. Acquired hypothyroidism  Cont levothyroxine.     3. Essential hypertension  Controlled.     4. Vitamin D deficiency  Cont vi tD.     Return in about 2 months " (around 6/8/2019).    Thank you for allowing me to participate in the care of this patient.    Dr. Lev Jackson  This note was scribed by Ember Griffin RN, CDE  04/08/19    CC:   Clay Maguire P.A.-C.    This note was created using voice recognition software (Dragon). The accuracy of the dictation is limited by the abilities of the software. I have reviewed the note prior to signing, however some errors in grammar and context are still possible. If you have any questions related to this note please do not hesitate to contact our office.

## 2019-04-08 ENCOUNTER — OFFICE VISIT (OUTPATIENT)
Dept: ENDOCRINOLOGY | Facility: MEDICAL CENTER | Age: 74
End: 2019-04-08
Payer: MEDICARE

## 2019-04-08 VITALS
DIASTOLIC BLOOD PRESSURE: 70 MMHG | HEIGHT: 63 IN | BODY MASS INDEX: 35.79 KG/M2 | HEART RATE: 87 BPM | SYSTOLIC BLOOD PRESSURE: 108 MMHG | OXYGEN SATURATION: 94 % | WEIGHT: 202 LBS

## 2019-04-08 DIAGNOSIS — E10.65 UNCONTROLLED TYPE 1 DIABETES MELLITUS WITH HYPERGLYCEMIA (HCC): ICD-10-CM

## 2019-04-08 DIAGNOSIS — I10 ESSENTIAL HYPERTENSION: ICD-10-CM

## 2019-04-08 DIAGNOSIS — E03.9 ACQUIRED HYPOTHYROIDISM: ICD-10-CM

## 2019-04-08 DIAGNOSIS — E55.9 VITAMIN D DEFICIENCY: ICD-10-CM

## 2019-04-08 PROCEDURE — 99214 OFFICE O/P EST MOD 30 MIN: CPT | Performed by: INTERNAL MEDICINE

## 2019-04-08 PROCEDURE — 95251 CONT GLUC MNTR ANALYSIS I&R: CPT | Performed by: INTERNAL MEDICINE

## 2019-04-11 NOTE — TELEPHONE ENCOUNTER
FINAL PRIOR AUTHORIZATION STATUS:    1.  Name of Medication & Dose: Contour test strips     2. Prior Auth Status (if approved--length of approval):  Approved 4/5/19-12/31/99    3. Action Taken: Pharmacy Notified: yes    Documentation was scanned into media and attached to this telephone encounter.

## 2019-04-25 ENCOUNTER — OFFICE VISIT (OUTPATIENT)
Dept: BEHAVIORAL HEALTH | Facility: CLINIC | Age: 74
End: 2019-04-25
Payer: MEDICARE

## 2019-04-25 VITALS
HEIGHT: 63 IN | WEIGHT: 201 LBS | DIASTOLIC BLOOD PRESSURE: 64 MMHG | HEART RATE: 75 BPM | BODY MASS INDEX: 35.61 KG/M2 | SYSTOLIC BLOOD PRESSURE: 124 MMHG

## 2019-04-25 DIAGNOSIS — F33.0 MDD (MAJOR DEPRESSIVE DISORDER), RECURRENT EPISODE, MILD (HCC): ICD-10-CM

## 2019-04-25 DIAGNOSIS — Z63.4 BEREAVEMENT: ICD-10-CM

## 2019-04-25 PROBLEM — F33.1 MAJOR DEPRESSIVE DISORDER, RECURRENT EPISODE, MODERATE (HCC): Status: RESOLVED | Noted: 2018-09-04 | Resolved: 2019-04-25

## 2019-04-25 PROCEDURE — 99213 OFFICE O/P EST LOW 20 MIN: CPT | Performed by: PSYCHIATRY & NEUROLOGY

## 2019-04-25 RX ORDER — BUPROPION HYDROCHLORIDE 300 MG/1
300 TABLET ORAL EVERY MORNING
Qty: 30 TAB | Refills: 0 | Status: SHIPPED | OUTPATIENT
Start: 2019-04-25 | End: 2019-05-28 | Stop reason: SDUPTHER

## 2019-04-25 SDOH — SOCIAL STABILITY - SOCIAL INSECURITY: DISSAPEARANCE AND DEATH OF FAMILY MEMBER: Z63.4

## 2019-04-25 ASSESSMENT — PATIENT HEALTH QUESTIONNAIRE - PHQ9
5. POOR APPETITE OR OVEREATING: 2 - MORE THAN HALF THE DAYS
SUM OF ALL RESPONSES TO PHQ QUESTIONS 1-9: 4
CLINICAL INTERPRETATION OF PHQ2 SCORE: 2

## 2019-04-25 NOTE — PROGRESS NOTES
PSYCHIATRY FOLLOW-UP NOTE      Chief Complaint   Patient presents with   • Follow-Up     depression         History Of Present Illness:  Geraldine Scott is a 73 y.o. old female with hypothyroidism, major depressive disorder, hypertension, dyslipidemia, diabetes mellitus type 1 comes in today for follow up, was last seen over 4 months ago.  She continues to struggle with depression on and off.  She did notice some improvements with the higher dose of Wellbutrin but not enough.  She recently lost her father to cancer complications and has been missing him a lot.  She feels that since she is here and all of her siblings are out of state it has been hard on her.  She has gone to a grief group psychotherapy group through hospice care and it was good for her.  She quit her job yesterday and feels good about it as it was stressing her a lot.  She is still looking forward to moving to a detention community in [place] at the end of next month.  She will be closer to her siblings and as looking forward to being more active in the community.  Sleep has been good but she still eating more than what she wants.  She still struggling with lack of motivation and fatigue and finds it hard for her to be physically active.  She recently took an impulsive decision and treated her new Prius for a Honda which caused her to go into financial debt and she is regretting that decision she denies any other symptoms consistent with hypomania or benjamin at this time.  She denies having thoughts of wanting to hurt herself or others.    Social History:   She is currently single and lives alone in [place] but is moving to [place] next month.  She has been  and  ×1.  She had a daughter who ran away when she was 17 years old and she has not had any contact with her since then.  She was in a long-term relationship for over 15 years which ended in early 80s.  Her mother is  and father lives in [place].  She is to oldest of 4 siblings.   Her younger siblings live in [place] and [place].    Substance Use:  Alcohol - Infrequent, may be once a month  Nicotine - Denies  Illicit drugs - Denies    Past Medication Trials:  Wellbutrin, Paxil.  She has also tried several tricyclic antidepressants in the 80s but endorses side effects from them    Medications:  Current Outpatient Prescriptions   Medication Sig Dispense Refill   • buPROPion (WELLBUTRIN XL) 300 MG XL tablet Take 1 Tab by mouth every morning. 30 Tab 0   • insulin lispro (HUMALOG) 100 UNIT/ML Solution Inject 66 Units as instructed Continuous. 60 mL 3   • Lancets Lancets order: Accu-Chek Fastclix lancets. Testing 4 times daily for insulin adjustment. 100 Each 11   • Blood Glucose Test Strips [employer] contour glucometer test strips for BG check 4-5 times a day 150 Each 6   • HUMALOG KWIKPEN 100 UNIT/ML Solution Pen-injector injection Inject 10 Units as instructed 3 times a day before meals. 10 PEN 3   • insulin glargine (LANTUS SOLOSTAR) 100 UNIT/ML Solution Pen-injector injection Inject 40 Units as instructed every evening.     • vitamin D, Ergocalciferol, (DRISDOL) 48794 units Cap capsule Take 1 Cap by mouth every 7 days. 12 Cap 3   • levothyroxine (SYNTHROID) 112 MCG Tab TAKE ONE TABLET BY MOUTH IN THE MORNING on an empty stomach 90 Tab 3   • lisinopril (PRINIVIL) 20 MG Tab Take 1 Tab by mouth every day. 90 Tab 3   • BD PEN NEEDLE DELMI U/F Inject 1 Each as instructed 6 Times a Day. 600 Each 3   • atorvastatin (LIPITOR) 20 MG Tab Take 1 Tab by mouth every bedtime. 90 Tab 1   • glucose blood strip 1 Each by Other route as needed.     • cholestyramine light (PREVALITE) 4 GM/DOSE powder Take  by mouth.       No current facility-administered medications for this visit.        Review Of Systems:    Constitutional - Positive for fatigue  Respiratory - Negative for shortness of breath, cough  CVS - Negative for chest pain, palpitations  GI - Negative for nausea, vomiting, abdominal pain, diarrhea,  "constipation  Musculoskeletal - Negative for back pain  Neurological - Negative for headaches  Psychiatric - Positive for depression    Physical Examination:  Vital signs: /64   Pulse 75   Ht 1.588 m (5' 2.5\")   Wt 91.2 kg (201 lb)   BMI 36.18 kg/m²     Musculoskeletal: Normal gait. No abnormal movements.     Mental Status Evaluation:   General: Elderly white female, dressed in casual attire, good grooming and hygiene, in no apparent distress, calm and cooperative, good eye contact, no psychomotor agitation or retardation  Orientation: Alert and oriented to person, place and time  Recent and remote memory: Grossly intact  Attention span and concentration: Grossly intact  Speech: Spontaneous, normal rate, rhythm and tone  Thought Process: Linear, logical and goal directed  Thought Content: Denies suicidal or homicidal ideations, intent or plan  Perception: Denies auditory or visual hallucinations. No delusions noted  Associations: Intact  Language: Appropriate  Fund of knowledge and vocabulary: Grossly adequate  Mood: \"oh, am okay\"  Affect: Dysphoric at times, mood congruent  Insight: Good  Judgment: Good    Depression screening:  Depression Screen (PHQ-2/PHQ-9) 2018   PHQ-2 Total Score 1 5 2   PHQ-9 Total Score 7 19 4     Interpretation of PHQ-9 Total Score   Score Severity   1-4 No Depression   5-9 Mild Depression   10-14 Moderate Depression   15-19 Moderately Severe Depression   20-27 Severe Depression    Medical Records/Labs/Diagnostic Tests Reviewed:  [facility] records - no prescribed controlled medications found in the last 1 year      Impression:  1. Major depressive disorder, recurrent, mild - slight improvement  2. Bereavement (father  3/2019) - new diagnosis    Plan:  1.  Continue Wellbutrin  mg for depression.  She would like to switch to generic instead of the brand name for financial reasons.  She has taken generic Wellbutrin in the past and had diarrhea.  I " advised her to watch for diarrhea and if she notices some side effects as before to let me know and I will switch her over to the brand name.  She will let me know before she leaves for [place] if she would like to continue the generic Bupropion or not.  2.  Continue individual psychotherapy with Dr. Mateusz Ulloa, Ph.D  3.  She was advised to continue going to grief group psychotherapy being offered through hospice care and to establish care with both psychiatry and psychology when she moves to [place].    Return to clinic on as needed basis if symptoms worsen    The proposed treatment plan was discussed with the patient who was provided the opportunity to ask questions and make suggestions regarding alternative treatment. Patient verbalized understanding and expressed agreement with the plan.     Usha Rae M.D.  04/25/19    This note was created using voice recognition software (Dragon). The accuracy of the dictation is limited by the abilities of the software. I have reviewed the note prior to signing, however some errors in grammar and context are still possible. If you have any questions related to this note please do not hesitate to contact our office.

## 2019-04-29 DIAGNOSIS — E10.65 UNCONTROLLED TYPE 1 DIABETES MELLITUS WITH HYPERGLYCEMIA (HCC): ICD-10-CM

## 2019-04-29 DIAGNOSIS — E78.5 DYSLIPIDEMIA: ICD-10-CM

## 2019-04-29 RX ORDER — ATORVASTATIN CALCIUM 20 MG/1
20 TABLET, FILM COATED ORAL
Qty: 90 TAB | Refills: 1 | Status: SHIPPED | OUTPATIENT
Start: 2019-04-29

## 2019-04-29 NOTE — TELEPHONE ENCOUNTER
Was the patient seen in the last year in this department? Yes  **LOV 4/8/19**    Does patient have an active prescription for medications requested? Yes    Received Request Via: Pharmacy

## 2019-04-30 ENCOUNTER — OFFICE VISIT (OUTPATIENT)
Dept: BEHAVIORAL HEALTH | Facility: CLINIC | Age: 74
End: 2019-04-30
Payer: MEDICARE

## 2019-04-30 DIAGNOSIS — F33.0 MDD (MAJOR DEPRESSIVE DISORDER), RECURRENT EPISODE, MILD (HCC): ICD-10-CM

## 2019-04-30 PROCEDURE — 90834 PSYTX W PT 45 MINUTES: CPT | Performed by: PSYCHOLOGIST

## 2019-04-30 NOTE — BH THERAPY
[facility]  Therapy Progress Note    Patient Name: Geraldine Scott  Patient MRN: [id number]  Today's Date: 4/30/2019     Type of session:Individual psychotherapy  Length of session: 45 minutes  Persons in attendance:Patient    Subjective/New Info:   Patient is a 73 year old female with history of diabetes mellitus type 1, hypertension, dyslipidemia, and hypothyroidism. She reports struggling with depression on and off since her late 30s/early 40s. She has been off antidepressants for over 10 years but has noticed on and off worsening depression since she moved from [place] to [place] in 2014. Patient recently quit her job and made the decision to move to [place] into a senior living facility. She appears generally content with these decisions although she has been having some second thoughts, but not thinking about changing her mind. Talked with patient about how she will re-establish a support system and adjust to a new area. She is generally optimistic.      Patient did not present in acute distress and she appeared less depressed. Patient was appropriately groomed and cooperative. Patient was alert and oriented to person. Place, and time. Eye contact was appropriate. No abnormalities in attention or concentration were noted. No abnormalities of movement present; psychomotor activity was normal. Speech was fluent and regular in rhythm, rate, volume, and tone. Thought processes were linear, logical, and goal-directed. There was no evidence of thought disorder. No auditory or visual hallucinations. Long and short term memory appeared to be intact. Insight, judgment, and impulse control were deemed to be within normal limits. Reported mood was improving. Affect was appropriate and congruent with thought content and conversation. Patient denied current suicidal and homicidal ideation in plan, intent, and preparatory behavior     Objective/Observations:  Participation: Active verbal participation,  Attentive, Engaged and Open to feedback  Grooming: Casual and Neat  Cognition: Alert and Fully Oriented  Eye contact: Good  Mood: Depressed  Affect: Flexible  Thought process: Logical  Speech: Rate within normal limits    Diagnoses:   1. MDD (major depressive disorder), recurrent episode, mild (HCC)         Current risk:   SUICIDE: Low   Homicide: Low   Self-harm: Low   Relapse: Not applicable   Other:    Safety Plan reviewed? Not Indicated   If evidence of imminent risk is present, intervention/plan:     Therapeutic Intervention(s): Cognitive modification, Distress tolerance skills, Self-care skills, Stressors assessed and Supportive psychotherapy    Treatment Goal(s)/Objective(s) addressed:   Reduce depression:  Objective A: Patient will increase activity level by will participating in at least two hours, three times per week in a social or leisure activity with family member or close friend.  Objective B: Patient will express an increase in positive statements about self by will making at least five positive statements per day about self or her circumstances.  Objective C: Patient will spend more time with peers/colleagues in social situations by spending at least thirty minutes four times per week in a social situation, interacting appropriately with a friend.         Progress toward Treatment Goals: Mild improvement    Plan:  - Continue Individual therapy    Mateusz Ulloa, Ph.D.  4/30/2019

## 2019-05-07 ENCOUNTER — APPOINTMENT (OUTPATIENT)
Dept: ENDOCRINOLOGY | Facility: MEDICAL CENTER | Age: 74
End: 2019-05-07
Payer: MEDICARE

## 2019-05-22 ENCOUNTER — OFFICE VISIT (OUTPATIENT)
Dept: BEHAVIORAL HEALTH | Facility: CLINIC | Age: 74
End: 2019-05-22
Payer: MEDICARE

## 2019-05-22 DIAGNOSIS — F33.0 MDD (MAJOR DEPRESSIVE DISORDER), RECURRENT EPISODE, MILD (HCC): ICD-10-CM

## 2019-05-22 PROCEDURE — 90834 PSYTX W PT 45 MINUTES: CPT | Performed by: PSYCHOLOGIST

## 2019-05-22 NOTE — BH THERAPY
[facility]  Therapy Progress Note    Patient Name: Geraldine Scott  Patient MRN: [id number]  Today's Date: 5/22/2019     Type of session:Individual psychotherapy  Length of session: 45 minutes  Persons in attendance:Patient    Subjective/New Info:   Patient is a 73 year old female with history of diabetes mellitus type 1, hypertension, dyslipidemia, and hypothyroidism. She reports struggling with depression on and off since her late 30s/early 40s. She has been off antidepressants for over 10 years but has noticed on and off worsening depression since she moved from [place] to [place] in 2014. Patient recently quit her job and made the decision to move to [place] into a senior living facility. Patient is moving to [place] next week and is making arrangement to continue treatment there once she is situated. Talked with patient about how she will spend her time once she has moved. 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 She appears cautiously optimistic about these decisions.      Patient did not present in acute distress and she appeared less depressed. Patient was appropriately groomed and cooperative. Patient was alert and oriented to person. Place, and time. Eye contact was appropriate. No abnormalities in attention or concentration were noted. No abnormalities of movement present; psychomotor activity was normal. Speech was fluent and regular in rhythm, rate, volume, and tone. Thought processes were linear, logical, and goal-directed. There was no evidence of thought disorder. No auditory or visual hallucinations. Long and short term memory appeared to be intact. Insight, judgment, and impulse control were deemed to be within normal limits. Reported mood was improving. Affect was appropriate and congruent with thought content and conversation. Patient denied current suicidal and homicidal ideation in plan, intent, and preparatory behavior      Objective/Observations:  Participation: Active verbal participation, Attentive, Engaged and Open to feedback  Grooming: Casual and Neat  Cognition: Alert and Fully Oriented  Eye contact: Good  Mood: Depressed  Affect: Flexible  Thought process: Logical  Speech: Rate within normal limits       Diagnoses:   1. MDD (major depressive disorder), recurrent episode, mild (HCC)         Current risk:   SUICIDE: Low   Homicide: Low   Self-harm: Low   Relapse: Not applicable   Other:    Safety Plan reviewed? Not Indicated   If evidence of imminent risk is present, intervention/plan:     Therapeutic Intervention(s): Cognitive modification, Conflict resolution skills, Distress tolerance skills, Leisure and recreation skills, Socialization, Stressors assessed and Supportive psychotherapy    Treatment Goal(s)/Objective(s) addressed:   Reduce depression:  Objective A: Patient will increase activity level by will participating in at least two hours, three times per week in a social or leisure activity with family member or close friend.  Objective B: Patient will express an increase in positive statements about self by will making at least five positive statements per day about self or her circumstances.  Objective C: Patient will spend more time with peers/colleagues in social situations by spending at least thirty minutes four times per week in a social situation, interacting appropriately with a friend.      Progress toward Treatment Goals: Mild improvement    Plan:  - Patient is moving to [place] and will establish care once she is settled.    Mateusz Ulloa, Ph.D.  5/22/2019

## 2019-06-01 NOTE — PROGRESS NOTES
[facility] Quality Flow/Interdisciplinary Rounds Progress Note        Quality Flow Rounds held on June 1, 2019    Disciplines Attending:  Bedside Nurse, ,  and Nursing Unit Leadership    Yannick Sun was admitted on 5/29/2019 11:36 PM    Anticipated Discharge Date:  Expected Discharge Date: 06/01/19    Disposition:    Randell Score:  Randell Scale Score: 17    Readmission Risk              Risk of Unplanned Readmission:        9           Discussed patient goal for the day, patient clinical progression, and barriers to discharge.   The following Goal(s) of the Day/Commitment(s) have been identified:  iv steriods      Brent Lennox  June 1, 2019 Subjective:   Geraldine Scott is a 73 y.o. 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  She is been seen with endocrinology.  Is happy with her progress there.  Recent A1c did increase to 8.3.  Was 7.5.  They are in the process of going through in getting her an insulin pump.  She requests no medication renewals today.  Is using Lantus as well as Humalog.    Incomplete immunization status  She states that she got her Tdap vaccination previously in [place].  Also received her pneumonia vaccines at [facility].       Current medicines (including changes today)  Current Outpatient Prescriptions   Medication Sig Dispense Refill   • insulin glargine (LANTUS SOLOSTAR) 100 UNIT/ML Solution Pen-injector injection Inject 40 Units as instructed every evening.     • vitamin D, Ergocalciferol, (DRISDOL) 43630 units Cap capsule Take 1 Cap by mouth every 7 days. 12 Cap 3   • levothyroxine (SYNTHROID) 112 MCG Tab TAKE ONE TABLET BY MOUTH IN THE MORNING on an empty stomach 90 Tab 3   • lisinopril (PRINIVIL) 20 MG Tab Take 1 Tab by mouth every day. 90 Tab 3   • BD PEN NEEDLE DELMI U/F Inject 1 Each as instructed 6 Times a Day. 600 Each 3   • WELLBUTRIN  MG XL tablet Take 1 Tab by mouth every morning. 90 Tab 0   • atorvastatin (LIPITOR) 20 MG Tab Take 1 Tab by mouth every bedtime. 90 Tab 1   • HUMALOG KWIKPEN 100 UNIT/ML Solution Pen-injector injection 10 Units 3 times a day before meals.     • Blood Glucose Monitoring Suppl Supplies [employer] contour glucometer test strips for BG check 4-5 times a day 150 Each 6   • glucose blood strip 1 Each by Other route as needed.     • cholestyramine light (PREVALITE) 4 GM/DOSE powder Take  by mouth.       No current facility-administered medications for this visit.      She  has a past medical history of  "Depression; Diabetes mellitus type 1 (HCC); Dyslipidemia; Obesity; Suicide attempt (HCC); and Thyroid disease. She also has no past medical history of Anxiety; Psychosis (HCC); or Self-injurious behavior.    Social History and Family History were reviewed and updated.    ROS   No chest pain, no shortness of breath, no abdominal pain and all other systems were reviewed and are negative.       Objective:     Blood pressure 132/70, pulse 82, temperature 36.9 °C (98.4 °F), temperature source Tympanic, resp. rate 14, height 1.575 m (5' 2\"), weight 92.4 kg (203 lb 11.3 oz), SpO2 95 %, not currently breastfeeding. Body mass index is 37.26 kg/m².   Physical Exam:  Constitutional: Alert, no distress.  Skin: Warm, dry, good turgor, no rashes in visible areas.  Eye: Equal, round and reactive, conjunctiva clear, lids normal.  ENMT: Lips without lesions, good dentition, oropharynx clear.  Neck: Trachea midline, no masses.   Lymph: No cervical or supraclavicular lymphadenopathy  Respiratory: Unlabored respiratory effort, lungs clear to auscultation, no wheezes, no ronchi.  Cardiovascular: Normal S1, S2, no murmur, no edema.  Abdomen: Soft, non-tender, no masses.  Psych: Alert and oriented x3, normal affect and mood.        Assessment and Plan:   The following treatment plan was discussed    1. 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  Uncontrolled.  Follow with endocrinology.  Hopefully she will have an insulin pump by the next visit.  Continue medication use.      2. Incomplete immunization status  We will obtain previous medical records from her previous PCP as well as [facility].      Followup: Return in about 6 months (around 8/21/2019), or if symptoms worsen or fail to improve.    Please note that this dictation was created using voice recognition software. I have made every reasonable attempt to correct obvious errors, but I " expect that there are errors of grammar and possibly content that I did not discover before finalizing the note.

## 2019-06-20 ENCOUNTER — APPOINTMENT (OUTPATIENT)
Dept: ENDOCRINOLOGY | Facility: MEDICAL CENTER | Age: 74
End: 2019-06-20
Payer: MEDICARE

## 2021-01-15 DIAGNOSIS — Z23 NEED FOR VACCINATION: ICD-10-CM
